# Patient Record
Sex: MALE | Race: BLACK OR AFRICAN AMERICAN | NOT HISPANIC OR LATINO | Employment: FULL TIME | RURAL
[De-identification: names, ages, dates, MRNs, and addresses within clinical notes are randomized per-mention and may not be internally consistent; named-entity substitution may affect disease eponyms.]

---

## 2021-10-01 ENCOUNTER — OFFICE VISIT (OUTPATIENT)
Dept: PRIMARY CARE CLINIC | Facility: CLINIC | Age: 36
End: 2021-10-01
Payer: COMMERCIAL

## 2021-10-01 VITALS
DIASTOLIC BLOOD PRESSURE: 68 MMHG | HEIGHT: 72 IN | HEART RATE: 63 BPM | WEIGHT: 206 LBS | SYSTOLIC BLOOD PRESSURE: 110 MMHG | RESPIRATION RATE: 20 BRPM | OXYGEN SATURATION: 99 % | TEMPERATURE: 98 F | BODY MASS INDEX: 27.9 KG/M2

## 2021-10-01 DIAGNOSIS — Z02.89 ENCOUNTER FOR PHYSICAL EXAMINATION RELATED TO EMPLOYMENT: ICD-10-CM

## 2021-10-01 DIAGNOSIS — Z13.1 SCREENING FOR DIABETES MELLITUS: ICD-10-CM

## 2021-10-01 DIAGNOSIS — Z13.220 SCREENING FOR LIPOID DISORDERS: Primary | ICD-10-CM

## 2021-10-01 LAB
CHOLEST SERPL-MCNC: 176 MG/DL (ref 0–200)
CHOLEST/HDLC SERPL: 2.7 {RATIO}
GLUCOSE SERPL-MCNC: 86 MG/DL (ref 74–106)
HDLC SERPL-MCNC: 66 MG/DL (ref 40–60)
LDLC SERPL CALC-MCNC: 93 MG/DL
LDLC/HDLC SERPL: 1.4 {RATIO}
NONHDLC SERPL-MCNC: 110 MG/DL
TRIGL SERPL-MCNC: 87 MG/DL (ref 35–150)
VLDLC SERPL-MCNC: 17 MG/DL

## 2021-10-01 PROCEDURE — 99395 PR PREVENTIVE VISIT,EST,18-39: ICD-10-PCS | Mod: ,,, | Performed by: FAMILY MEDICINE

## 2021-10-01 PROCEDURE — 82947 ASSAY GLUCOSE BLOOD QUANT: CPT | Mod: ,,, | Performed by: CLINICAL MEDICAL LABORATORY

## 2021-10-01 PROCEDURE — 82947 GLUCOSE, FASTING: ICD-10-PCS | Mod: ,,, | Performed by: CLINICAL MEDICAL LABORATORY

## 2021-10-01 PROCEDURE — 80061 LIPID PANEL: ICD-10-PCS | Mod: ,,, | Performed by: CLINICAL MEDICAL LABORATORY

## 2021-10-01 PROCEDURE — 80061 LIPID PANEL: CPT | Mod: ,,, | Performed by: CLINICAL MEDICAL LABORATORY

## 2021-10-01 PROCEDURE — 99395 PREV VISIT EST AGE 18-39: CPT | Mod: ,,, | Performed by: FAMILY MEDICINE

## 2022-01-03 PROBLEM — Z13.220 SCREENING FOR LIPOID DISORDERS: Status: RESOLVED | Noted: 2021-10-01 | Resolved: 2022-01-03

## 2022-06-17 ENCOUNTER — OFFICE VISIT (OUTPATIENT)
Dept: PRIMARY CARE CLINIC | Facility: CLINIC | Age: 37
End: 2022-06-17
Payer: COMMERCIAL

## 2022-06-17 VITALS
HEIGHT: 72 IN | SYSTOLIC BLOOD PRESSURE: 120 MMHG | WEIGHT: 190 LBS | HEART RATE: 60 BPM | OXYGEN SATURATION: 96 % | DIASTOLIC BLOOD PRESSURE: 78 MMHG | TEMPERATURE: 98 F | BODY MASS INDEX: 25.73 KG/M2

## 2022-06-17 DIAGNOSIS — F41.9 ANXIETY: ICD-10-CM

## 2022-06-17 DIAGNOSIS — R00.2 HEART PALPITATIONS: ICD-10-CM

## 2022-06-17 DIAGNOSIS — R53.83 FATIGUE, UNSPECIFIED TYPE: Primary | ICD-10-CM

## 2022-06-17 DIAGNOSIS — R07.89 ATYPICAL CHEST PAIN: ICD-10-CM

## 2022-06-17 LAB
ALBUMIN SERPL BCP-MCNC: 4 G/DL (ref 3.5–5)
ALBUMIN/GLOB SERPL: 1.1 {RATIO}
ALP SERPL-CCNC: 44 U/L (ref 45–115)
ALT SERPL W P-5'-P-CCNC: 34 U/L (ref 16–61)
ANION GAP SERPL CALCULATED.3IONS-SCNC: 9 MMOL/L (ref 7–16)
ANISOCYTOSIS BLD QL SMEAR: ABNORMAL
AST SERPL W P-5'-P-CCNC: 63 U/L (ref 15–37)
BASOPHILS # BLD AUTO: 0.05 K/UL (ref 0–0.2)
BASOPHILS NFR BLD AUTO: 1 % (ref 0–1)
BILIRUB SERPL-MCNC: 0.9 MG/DL (ref 0–1.2)
BUN SERPL-MCNC: 18 MG/DL (ref 7–18)
BUN/CREAT SERPL: 13 (ref 6–20)
CALCIUM SERPL-MCNC: 9.5 MG/DL (ref 8.5–10.1)
CHLORIDE SERPL-SCNC: 106 MMOL/L (ref 98–107)
CO2 SERPL-SCNC: 31 MMOL/L (ref 21–32)
CREAT SERPL-MCNC: 1.4 MG/DL (ref 0.7–1.3)
DIFFERENTIAL METHOD BLD: ABNORMAL
EOSINOPHIL # BLD AUTO: 0.02 K/UL (ref 0–0.5)
EOSINOPHIL NFR BLD AUTO: 0.4 % (ref 1–4)
ERYTHROCYTE [DISTWIDTH] IN BLOOD BY AUTOMATED COUNT: 15.6 % (ref 11.5–14.5)
GLOBULIN SER-MCNC: 3.5 G/DL (ref 2–4)
GLUCOSE SERPL-MCNC: 77 MG/DL (ref 74–106)
HCT VFR BLD AUTO: 51.2 % (ref 40–54)
HGB BLD-MCNC: 15.5 G/DL (ref 13.5–18)
HYPOCHROMIA BLD QL SMEAR: ABNORMAL
IMM GRANULOCYTES # BLD AUTO: 0.01 K/UL (ref 0–0.04)
IMM GRANULOCYTES NFR BLD: 0.2 % (ref 0–0.4)
LYMPHOCYTES # BLD AUTO: 2.04 K/UL (ref 1–4.8)
LYMPHOCYTES NFR BLD AUTO: 42.5 % (ref 27–41)
MCH RBC QN AUTO: 23.4 PG (ref 27–31)
MCHC RBC AUTO-ENTMCNC: 30.3 G/DL (ref 32–36)
MCV RBC AUTO: 77.2 FL (ref 80–96)
MICROCYTES BLD QL SMEAR: ABNORMAL
MONOCYTES # BLD AUTO: 0.47 K/UL (ref 0–0.8)
MONOCYTES NFR BLD AUTO: 9.8 % (ref 2–6)
MPC BLD CALC-MCNC: 11.5 FL (ref 9.4–12.4)
NEUTROPHILS # BLD AUTO: 2.21 K/UL (ref 1.8–7.7)
NEUTROPHILS NFR BLD AUTO: 46.1 % (ref 53–65)
NRBC # BLD AUTO: 0 X10E3/UL
NRBC, AUTO (.00): 0 %
PLATELET # BLD AUTO: 211 K/UL (ref 150–400)
PLATELET MORPHOLOGY: ABNORMAL
POTASSIUM SERPL-SCNC: 4.6 MMOL/L (ref 3.5–5.1)
PROT SERPL-MCNC: 7.5 G/DL (ref 6.4–8.2)
RBC # BLD AUTO: 6.63 M/UL (ref 4.6–6.2)
SODIUM SERPL-SCNC: 141 MMOL/L (ref 136–145)
TSH SERPL DL<=0.005 MIU/L-ACNC: 0.33 UIU/ML (ref 0.36–3.74)
WBC # BLD AUTO: 4.8 K/UL (ref 4.5–11)

## 2022-06-17 PROCEDURE — 3078F PR MOST RECENT DIASTOLIC BLOOD PRESSURE < 80 MM HG: ICD-10-PCS | Mod: CPTII,,, | Performed by: FAMILY MEDICINE

## 2022-06-17 PROCEDURE — 80053 COMPREHENSIVE METABOLIC PANEL: ICD-10-PCS | Mod: ,,, | Performed by: CLINICAL MEDICAL LABORATORY

## 2022-06-17 PROCEDURE — 99213 PR OFFICE/OUTPT VISIT, EST, LEVL III, 20-29 MIN: ICD-10-PCS | Mod: ,,, | Performed by: FAMILY MEDICINE

## 2022-06-17 PROCEDURE — 85025 CBC WITH DIFFERENTIAL: ICD-10-PCS | Mod: ,,, | Performed by: CLINICAL MEDICAL LABORATORY

## 2022-06-17 PROCEDURE — 3008F PR BODY MASS INDEX (BMI) DOCUMENTED: ICD-10-PCS | Mod: CPTII,,, | Performed by: FAMILY MEDICINE

## 2022-06-17 PROCEDURE — 99213 OFFICE O/P EST LOW 20 MIN: CPT | Mod: ,,, | Performed by: FAMILY MEDICINE

## 2022-06-17 PROCEDURE — 1159F PR MEDICATION LIST DOCUMENTED IN MEDICAL RECORD: ICD-10-PCS | Mod: CPTII,,, | Performed by: FAMILY MEDICINE

## 2022-06-17 PROCEDURE — 93005 PR ELECTROCARDIOGRAM, TRACING: ICD-10-PCS | Mod: ,,, | Performed by: FAMILY MEDICINE

## 2022-06-17 PROCEDURE — 84443 TSH: ICD-10-PCS | Mod: ,,, | Performed by: CLINICAL MEDICAL LABORATORY

## 2022-06-17 PROCEDURE — 3008F BODY MASS INDEX DOCD: CPT | Mod: CPTII,,, | Performed by: FAMILY MEDICINE

## 2022-06-17 PROCEDURE — 80053 COMPREHEN METABOLIC PANEL: CPT | Mod: ,,, | Performed by: CLINICAL MEDICAL LABORATORY

## 2022-06-17 PROCEDURE — 1159F MED LIST DOCD IN RCRD: CPT | Mod: CPTII,,, | Performed by: FAMILY MEDICINE

## 2022-06-17 PROCEDURE — 85025 COMPLETE CBC W/AUTO DIFF WBC: CPT | Mod: ,,, | Performed by: CLINICAL MEDICAL LABORATORY

## 2022-06-17 PROCEDURE — 3078F DIAST BP <80 MM HG: CPT | Mod: CPTII,,, | Performed by: FAMILY MEDICINE

## 2022-06-17 PROCEDURE — 3074F PR MOST RECENT SYSTOLIC BLOOD PRESSURE < 130 MM HG: ICD-10-PCS | Mod: CPTII,,, | Performed by: FAMILY MEDICINE

## 2022-06-17 PROCEDURE — 84443 ASSAY THYROID STIM HORMONE: CPT | Mod: ,,, | Performed by: CLINICAL MEDICAL LABORATORY

## 2022-06-17 PROCEDURE — 93005 ELECTROCARDIOGRAM TRACING: CPT | Mod: ,,, | Performed by: FAMILY MEDICINE

## 2022-06-17 PROCEDURE — 3074F SYST BP LT 130 MM HG: CPT | Mod: CPTII,,, | Performed by: FAMILY MEDICINE

## 2022-06-22 ENCOUNTER — TELEPHONE (OUTPATIENT)
Dept: PRIMARY CARE CLINIC | Facility: CLINIC | Age: 37
End: 2022-06-22
Payer: COMMERCIAL

## 2022-08-30 ENCOUNTER — OFFICE VISIT (OUTPATIENT)
Dept: PRIMARY CARE CLINIC | Facility: CLINIC | Age: 37
End: 2022-08-30
Payer: COMMERCIAL

## 2022-08-30 VITALS
RESPIRATION RATE: 20 BRPM | BODY MASS INDEX: 24.12 KG/M2 | SYSTOLIC BLOOD PRESSURE: 122 MMHG | TEMPERATURE: 99 F | OXYGEN SATURATION: 98 % | HEART RATE: 69 BPM | DIASTOLIC BLOOD PRESSURE: 82 MMHG | WEIGHT: 182 LBS | HEIGHT: 73 IN

## 2022-08-30 DIAGNOSIS — J32.9 SINUSITIS, UNSPECIFIED CHRONICITY, UNSPECIFIED LOCATION: Primary | ICD-10-CM

## 2022-08-30 DIAGNOSIS — J06.9 UPPER RESPIRATORY TRACT INFECTION, UNSPECIFIED TYPE: ICD-10-CM

## 2022-08-30 PROCEDURE — 3074F PR MOST RECENT SYSTOLIC BLOOD PRESSURE < 130 MM HG: ICD-10-PCS | Mod: CPTII,,, | Performed by: FAMILY MEDICINE

## 2022-08-30 PROCEDURE — 3074F SYST BP LT 130 MM HG: CPT | Mod: CPTII,,, | Performed by: FAMILY MEDICINE

## 2022-08-30 PROCEDURE — 99213 PR OFFICE/OUTPT VISIT, EST, LEVL III, 20-29 MIN: ICD-10-PCS | Mod: 25,,, | Performed by: FAMILY MEDICINE

## 2022-08-30 PROCEDURE — 1160F RVW MEDS BY RX/DR IN RCRD: CPT | Mod: CPTII,,, | Performed by: FAMILY MEDICINE

## 2022-08-30 PROCEDURE — 3079F DIAST BP 80-89 MM HG: CPT | Mod: CPTII,,, | Performed by: FAMILY MEDICINE

## 2022-08-30 PROCEDURE — 1159F MED LIST DOCD IN RCRD: CPT | Mod: CPTII,,, | Performed by: FAMILY MEDICINE

## 2022-08-30 PROCEDURE — 96372 PR INJECTION,THERAP/PROPH/DIAG2ST, IM OR SUBCUT: ICD-10-PCS | Mod: ,,, | Performed by: FAMILY MEDICINE

## 2022-08-30 PROCEDURE — 96372 THER/PROPH/DIAG INJ SC/IM: CPT | Mod: ,,, | Performed by: FAMILY MEDICINE

## 2022-08-30 PROCEDURE — 3008F BODY MASS INDEX DOCD: CPT | Mod: CPTII,,, | Performed by: FAMILY MEDICINE

## 2022-08-30 PROCEDURE — 3008F PR BODY MASS INDEX (BMI) DOCUMENTED: ICD-10-PCS | Mod: CPTII,,, | Performed by: FAMILY MEDICINE

## 2022-08-30 PROCEDURE — 1159F PR MEDICATION LIST DOCUMENTED IN MEDICAL RECORD: ICD-10-PCS | Mod: CPTII,,, | Performed by: FAMILY MEDICINE

## 2022-08-30 PROCEDURE — 99213 OFFICE O/P EST LOW 20 MIN: CPT | Mod: 25,,, | Performed by: FAMILY MEDICINE

## 2022-08-30 PROCEDURE — 3079F PR MOST RECENT DIASTOLIC BLOOD PRESSURE 80-89 MM HG: ICD-10-PCS | Mod: CPTII,,, | Performed by: FAMILY MEDICINE

## 2022-08-30 PROCEDURE — 1160F PR REVIEW ALL MEDS BY PRESCRIBER/CLIN PHARMACIST DOCUMENTED: ICD-10-PCS | Mod: CPTII,,, | Performed by: FAMILY MEDICINE

## 2022-08-30 RX ORDER — METHYLPREDNISOLONE 4 MG/1
TABLET ORAL
Qty: 21 EACH | Refills: 0 | Status: SHIPPED | OUTPATIENT
Start: 2022-08-30 | End: 2022-09-20

## 2022-08-30 RX ORDER — CEFDINIR 300 MG/1
300 CAPSULE ORAL 2 TIMES DAILY
Qty: 20 CAPSULE | Refills: 0 | Status: SHIPPED | OUTPATIENT
Start: 2022-08-30 | End: 2022-09-09

## 2022-08-30 RX ORDER — BETAMETHASONE SODIUM PHOSPHATE AND BETAMETHASONE ACETATE 3; 3 MG/ML; MG/ML
6 INJECTION, SUSPENSION INTRA-ARTICULAR; INTRALESIONAL; INTRAMUSCULAR; SOFT TISSUE
Status: COMPLETED | OUTPATIENT
Start: 2022-08-30 | End: 2022-08-30

## 2022-08-30 RX ORDER — CEFTRIAXONE 1 G/1
1 INJECTION, POWDER, FOR SOLUTION INTRAMUSCULAR; INTRAVENOUS
Status: COMPLETED | OUTPATIENT
Start: 2022-08-30 | End: 2022-08-30

## 2022-08-30 RX ORDER — DEXCHLORPHENIRAMINE MALEATE, DEXTROMETHORPHAN HBR, PHENYLEPHRINE HCL 1; 10; 5 MG/5ML; MG/5ML; MG/5ML
10 SYRUP ORAL
Qty: 240 ML | Refills: 1 | Status: SHIPPED | OUTPATIENT
Start: 2022-08-30

## 2022-08-30 RX ADMIN — BETAMETHASONE SODIUM PHOSPHATE AND BETAMETHASONE ACETATE 6 MG: 3; 3 INJECTION, SUSPENSION INTRA-ARTICULAR; INTRALESIONAL; INTRAMUSCULAR; SOFT TISSUE at 12:08

## 2022-08-30 RX ADMIN — CEFTRIAXONE 1 G: 1 INJECTION, POWDER, FOR SOLUTION INTRAMUSCULAR; INTRAVENOUS at 12:08

## 2022-08-30 NOTE — PROGRESS NOTES
Subjective:       Patient ID: Mane Newton is a 36 y.o. male.    Chief Complaint: Sinus Problem, Nasal Congestion, and Cough    Congested with a cough. No fever.    Sinus Problem  Associated symptoms include congestion and coughing. Pertinent negatives include no headaches or shortness of breath.   Cough  Pertinent negatives include no chest pain, fever, headaches, myalgias or shortness of breath. There is no history of environmental allergies.   Review of Systems   Constitutional:  Negative for fatigue and fever.   HENT:  Positive for nasal congestion. Negative for dental problem.    Eyes:  Negative for discharge.   Respiratory:  Positive for cough. Negative for choking, chest tightness and shortness of breath.    Cardiovascular:  Negative for chest pain and leg swelling.   Gastrointestinal:  Negative for constipation, diarrhea, nausea and vomiting.   Genitourinary:  Negative for discharge and flank pain.   Musculoskeletal:  Negative for arthralgias and myalgias.   Allergic/Immunologic: Negative for environmental allergies.   Neurological:  Negative for headaches and memory loss.   Psychiatric/Behavioral:  Negative for behavioral problems and hallucinations.        Objective:      Physical Exam  Vitals and nursing note reviewed.   Constitutional:       Appearance: Normal appearance. He is normal weight.   HENT:      Head: Normocephalic and atraumatic.      Right Ear: Tympanic membrane normal.      Left Ear: Tympanic membrane normal.      Nose: Congestion present.      Mouth/Throat:      Mouth: Mucous membranes are moist.      Pharynx: No oropharyngeal exudate or posterior oropharyngeal erythema.   Eyes:      Extraocular Movements: Extraocular movements intact.      Conjunctiva/sclera: Conjunctivae normal.      Pupils: Pupils are equal, round, and reactive to light.   Cardiovascular:      Rate and Rhythm: Normal rate and regular rhythm.      Pulses: Normal pulses.   Pulmonary:      Effort: Pulmonary effort is  normal.      Breath sounds: Normal breath sounds.   Abdominal:      General: Abdomen is flat. Bowel sounds are normal.      Palpations: Abdomen is soft.   Musculoskeletal:         General: Normal range of motion.      Cervical back: Normal range of motion and neck supple.   Skin:     General: Skin is warm and dry.   Neurological:      General: No focal deficit present.      Mental Status: He is alert and oriented to person, place, and time.   Psychiatric:         Mood and Affect: Mood normal.       Assessment:       Problem List Items Addressed This Visit          ENT    Sinusitis - Primary    Relevant Medications    cefTRIAXone injection 1 g (Start on 8/30/2022 12:15 PM)    betamethasone acetate-betamethasone sodium phosphate injection 6 mg (Start on 8/30/2022 12:15 PM)    Upper respiratory tract infection    Relevant Medications    cefdinir (OMNICEF) 300 MG capsule    methylPREDNISolone (MEDROL DOSEPACK) 4 mg tablet    dexchlorphen-phenylephrine-DM (POLYTUSSIN DM) 1-5-10 mg/5 mL Syrp       Plan:       RTC if s/sx continue

## 2022-10-20 ENCOUNTER — OFFICE VISIT (OUTPATIENT)
Dept: FAMILY MEDICINE | Facility: CLINIC | Age: 37
End: 2022-10-20
Payer: COMMERCIAL

## 2022-10-20 VITALS
SYSTOLIC BLOOD PRESSURE: 110 MMHG | HEART RATE: 73 BPM | HEIGHT: 73 IN | WEIGHT: 191.63 LBS | TEMPERATURE: 98 F | DIASTOLIC BLOOD PRESSURE: 80 MMHG | OXYGEN SATURATION: 98 % | BODY MASS INDEX: 25.4 KG/M2

## 2022-10-20 DIAGNOSIS — R05.1 ACUTE COUGH: ICD-10-CM

## 2022-10-20 DIAGNOSIS — J01.00 ACUTE NON-RECURRENT MAXILLARY SINUSITIS: Primary | ICD-10-CM

## 2022-10-20 PROCEDURE — 99213 OFFICE O/P EST LOW 20 MIN: CPT | Mod: 25,,, | Performed by: FAMILY MEDICINE

## 2022-10-20 PROCEDURE — 3074F PR MOST RECENT SYSTOLIC BLOOD PRESSURE < 130 MM HG: ICD-10-PCS | Mod: CPTII,,, | Performed by: FAMILY MEDICINE

## 2022-10-20 PROCEDURE — 3079F PR MOST RECENT DIASTOLIC BLOOD PRESSURE 80-89 MM HG: ICD-10-PCS | Mod: CPTII,,, | Performed by: FAMILY MEDICINE

## 2022-10-20 PROCEDURE — 3074F SYST BP LT 130 MM HG: CPT | Mod: CPTII,,, | Performed by: FAMILY MEDICINE

## 2022-10-20 PROCEDURE — 1159F MED LIST DOCD IN RCRD: CPT | Mod: CPTII,,, | Performed by: FAMILY MEDICINE

## 2022-10-20 PROCEDURE — 99213 PR OFFICE/OUTPT VISIT, EST, LEVL III, 20-29 MIN: ICD-10-PCS | Mod: 25,,, | Performed by: FAMILY MEDICINE

## 2022-10-20 PROCEDURE — 96372 PR INJECTION,THERAP/PROPH/DIAG2ST, IM OR SUBCUT: ICD-10-PCS | Mod: ,,, | Performed by: FAMILY MEDICINE

## 2022-10-20 PROCEDURE — 1159F PR MEDICATION LIST DOCUMENTED IN MEDICAL RECORD: ICD-10-PCS | Mod: CPTII,,, | Performed by: FAMILY MEDICINE

## 2022-10-20 PROCEDURE — 96372 THER/PROPH/DIAG INJ SC/IM: CPT | Mod: ,,, | Performed by: FAMILY MEDICINE

## 2022-10-20 PROCEDURE — 3079F DIAST BP 80-89 MM HG: CPT | Mod: CPTII,,, | Performed by: FAMILY MEDICINE

## 2022-10-20 RX ORDER — AZITHROMYCIN 250 MG/1
TABLET, FILM COATED ORAL
Qty: 6 TABLET | Refills: 0 | Status: SHIPPED | OUTPATIENT
Start: 2022-10-20

## 2022-10-20 RX ORDER — METHYLPREDNISOLONE ACETATE 80 MG/ML
40 INJECTION, SUSPENSION INTRA-ARTICULAR; INTRALESIONAL; INTRAMUSCULAR; SOFT TISSUE
Status: COMPLETED | OUTPATIENT
Start: 2022-10-20 | End: 2022-10-20

## 2022-10-20 RX ORDER — BROMPHENIRAMINE MALEATE, PSEUDOEPHEDRINE HYDROCHLORIDE, AND DEXTROMETHORPHAN HYDROBROMIDE 2; 30; 10 MG/5ML; MG/5ML; MG/5ML
10 SYRUP ORAL EVERY 4 HOURS PRN
Qty: 240 ML | Refills: 0 | Status: SHIPPED | OUTPATIENT
Start: 2022-10-20 | End: 2022-10-30

## 2022-10-20 RX ORDER — CEFTRIAXONE 1 G/1
1 INJECTION, POWDER, FOR SOLUTION INTRAMUSCULAR; INTRAVENOUS
Status: COMPLETED | OUTPATIENT
Start: 2022-10-20 | End: 2022-10-20

## 2022-10-20 RX ORDER — DEXAMETHASONE SODIUM PHOSPHATE 4 MG/ML
4 INJECTION, SOLUTION INTRA-ARTICULAR; INTRALESIONAL; INTRAMUSCULAR; INTRAVENOUS; SOFT TISSUE
Status: COMPLETED | OUTPATIENT
Start: 2022-10-20 | End: 2022-10-20

## 2022-10-20 RX ADMIN — DEXAMETHASONE SODIUM PHOSPHATE 4 MG: 4 INJECTION, SOLUTION INTRA-ARTICULAR; INTRALESIONAL; INTRAMUSCULAR; INTRAVENOUS; SOFT TISSUE at 10:10

## 2022-10-20 RX ADMIN — CEFTRIAXONE 1 G: 1 INJECTION, POWDER, FOR SOLUTION INTRAMUSCULAR; INTRAVENOUS at 10:10

## 2022-10-20 RX ADMIN — METHYLPREDNISOLONE ACETATE 40 MG: 80 INJECTION, SUSPENSION INTRA-ARTICULAR; INTRALESIONAL; INTRAMUSCULAR; SOFT TISSUE at 10:10

## 2022-10-20 NOTE — PROGRESS NOTES
Sukh Centeno MD   [facility]  [address] [place] [address]     PATIENT NAME: Mane Newton  : 1985  DATE: 10/20/22  MRN: [id number]      Billing Provider: Sukh Centeno MD  Level of Service:   Patient PCP Information       Provider PCP Type    Diane Lord MD General            Reason for Visit / Chief Complaint: Sinusitis (Cough, runny nose, sore throat, and sneezing x a couple of days. Child tested positive for Flu on 10/18/22. )         History of Present Illness / Problem Focused Workflow     Mane Newton presents to the clinic with Sinusitis (Cough, runny nose, sore throat, and sneezing x a couple of days. Child tested positive for Flu on 10/18/22. )     HPI    Review of Systems     Review of Systems   Constitutional:  Negative for activity change, appetite change, fatigue and fever.   HENT:  Positive for rhinorrhea and sinus pressure/congestion. Negative for nasal congestion, ear pain, hearing loss and sore throat.    Respiratory:  Positive for cough. Negative for chest tightness and shortness of breath.    Cardiovascular:  Negative for chest pain and palpitations.   Gastrointestinal:  Negative for abdominal pain and fecal incontinence.   Genitourinary:  Negative for bladder incontinence, difficulty urinating and erectile dysfunction.   Musculoskeletal:  Negative for arthralgias.   Integumentary:  Negative for rash.   Neurological:  Negative for dizziness and headaches.      Medical / Social / Family History   History reviewed. No pertinent past medical history.    History reviewed. No pertinent surgical history.    Social History  Mane Newton  reports that he has never smoked. He has never used smokeless tobacco. He reports current alcohol use. He reports that he does not use drugs.    Family History  Mane Newton  family history includes Cancer in his father.    Medications and Allergies     Medications  No outpatient medications have  been marked as taking for the 10/20/22 encounter (Office Visit) with Sukh Centeno MD.       Allergies  Review of patient's allergies indicates:  No Known Allergies    Physical Examination     Vitals:    10/20/22 1018   BP: 110/80   Pulse: 73   Temp: 97.9 °F (36.6 °C)     Physical Exam  Constitutional:       General: He is not in acute distress.     Appearance: He is not ill-appearing.   HENT:      Head: Normocephalic and atraumatic.      Right Ear: Tympanic membrane and ear canal normal.      Left Ear: Tympanic membrane and ear canal normal.      Nose: Congestion and rhinorrhea present.      Mouth/Throat:      Pharynx: Oropharyngeal exudate and posterior oropharyngeal erythema present.   Eyes:      Pupils: Pupils are equal, round, and reactive to light.   Cardiovascular:      Rate and Rhythm: Normal rate and regular rhythm.      Pulses: Normal pulses.      Heart sounds: No murmur heard.  Pulmonary:      Effort: No respiratory distress.      Breath sounds: No wheezing, rhonchi or rales.   Abdominal:      General: Bowel sounds are normal.      Palpations: Abdomen is soft.      Tenderness: There is no abdominal tenderness.      Hernia: No hernia is present.   Musculoskeletal:      Cervical back: Normal range of motion and neck supple.   Lymphadenopathy:      Cervical: No cervical adenopathy.   Skin:     General: Skin is warm and dry.   Neurological:      Mental Status: He is alert.   Psychiatric:         Behavior: Behavior normal.         Thought Content: Thought content normal.        Assessment and Plan (including Health Maintenance)   :    Plan:         Health Maintenance Due   Topic Date Due    Hepatitis C Screening  Never done    COVID-19 Vaccine (1) Never done    HIV Screening  Never done    TETANUS VACCINE  Never done    Influenza Vaccine (1) Never done       Problem List Items Addressed This Visit    None    There are no diagnoses linked to this encounter.   Health Maintenance Topics with due  status: Not Due       Topic Last Completion Date    Lipid Panel 10/01/2021       Procedures     No future appointments.     No follow-ups on file.       Signature:  Sukh Centeno MD  [facility]  [address] [place] [address]  [phone] Phone  [phone] Fax    Date of encounter: 10/20/22

## 2022-12-12 ENCOUNTER — OFFICE VISIT (OUTPATIENT)
Dept: PRIMARY CARE CLINIC | Facility: CLINIC | Age: 37
End: 2022-12-12
Payer: COMMERCIAL

## 2022-12-12 VITALS
SYSTOLIC BLOOD PRESSURE: 116 MMHG | BODY MASS INDEX: 25.05 KG/M2 | WEIGHT: 189 LBS | TEMPERATURE: 98 F | DIASTOLIC BLOOD PRESSURE: 62 MMHG | HEART RATE: 71 BPM | RESPIRATION RATE: 18 BRPM | HEIGHT: 73 IN | OXYGEN SATURATION: 98 %

## 2022-12-12 DIAGNOSIS — Z00.00 ENCOUNTER FOR ANNUAL HEALTH EXAMINATION: Primary | ICD-10-CM

## 2022-12-12 LAB
CHOLEST SERPL-MCNC: 185 MG/DL (ref 0–200)
CHOLEST/HDLC SERPL: 2.3 {RATIO}
GLUCOSE SERPL-MCNC: 95 MG/DL (ref 74–106)
HDLC SERPL-MCNC: 82 MG/DL (ref 40–60)
LDLC SERPL CALC-MCNC: 91 MG/DL
LDLC/HDLC SERPL: 1.1 {RATIO}
NONHDLC SERPL-MCNC: 103 MG/DL
TRIGL SERPL-MCNC: 59 MG/DL (ref 35–150)
VLDLC SERPL-MCNC: 12 MG/DL

## 2022-12-12 PROCEDURE — 3078F PR MOST RECENT DIASTOLIC BLOOD PRESSURE < 80 MM HG: ICD-10-PCS | Mod: CPTII,,, | Performed by: NURSE PRACTITIONER

## 2022-12-12 PROCEDURE — 3074F SYST BP LT 130 MM HG: CPT | Mod: CPTII,,, | Performed by: NURSE PRACTITIONER

## 2022-12-12 PROCEDURE — 1159F PR MEDICATION LIST DOCUMENTED IN MEDICAL RECORD: ICD-10-PCS | Mod: CPTII,,, | Performed by: NURSE PRACTITIONER

## 2022-12-12 PROCEDURE — 1159F MED LIST DOCD IN RCRD: CPT | Mod: CPTII,,, | Performed by: NURSE PRACTITIONER

## 2022-12-12 PROCEDURE — 3074F PR MOST RECENT SYSTOLIC BLOOD PRESSURE < 130 MM HG: ICD-10-PCS | Mod: CPTII,,, | Performed by: NURSE PRACTITIONER

## 2022-12-12 PROCEDURE — 82947 GLUCOSE, RANDOM: ICD-10-PCS | Mod: ,,, | Performed by: CLINICAL MEDICAL LABORATORY

## 2022-12-12 PROCEDURE — 99395 PREV VISIT EST AGE 18-39: CPT | Mod: ,,, | Performed by: NURSE PRACTITIONER

## 2022-12-12 PROCEDURE — 82947 ASSAY GLUCOSE BLOOD QUANT: CPT | Mod: ,,, | Performed by: CLINICAL MEDICAL LABORATORY

## 2022-12-12 PROCEDURE — 1160F PR REVIEW ALL MEDS BY PRESCRIBER/CLIN PHARMACIST DOCUMENTED: ICD-10-PCS | Mod: CPTII,,, | Performed by: NURSE PRACTITIONER

## 2022-12-12 PROCEDURE — 80061 LIPID PANEL: CPT | Mod: ,,, | Performed by: CLINICAL MEDICAL LABORATORY

## 2022-12-12 PROCEDURE — 3008F BODY MASS INDEX DOCD: CPT | Mod: CPTII,,, | Performed by: NURSE PRACTITIONER

## 2022-12-12 PROCEDURE — 1160F RVW MEDS BY RX/DR IN RCRD: CPT | Mod: CPTII,,, | Performed by: NURSE PRACTITIONER

## 2022-12-12 PROCEDURE — 99395 PR PREVENTIVE VISIT,EST,18-39: ICD-10-PCS | Mod: ,,, | Performed by: NURSE PRACTITIONER

## 2022-12-12 PROCEDURE — 80061 LIPID PANEL: ICD-10-PCS | Mod: ,,, | Performed by: CLINICAL MEDICAL LABORATORY

## 2022-12-12 PROCEDURE — 3078F DIAST BP <80 MM HG: CPT | Mod: CPTII,,, | Performed by: NURSE PRACTITIONER

## 2022-12-12 PROCEDURE — 3008F PR BODY MASS INDEX (BMI) DOCUMENTED: ICD-10-PCS | Mod: CPTII,,, | Performed by: NURSE PRACTITIONER

## 2022-12-12 NOTE — PROGRESS NOTES
[facility]  Primary Care       PATIENT NAME: Mane Newton   : 1985    AGE: 36 y.o. DATE: 2022    MRN: [id number]        Reason for Visit / Chief Complaint:  Annual Exam (Health wellness screen  physical for GP.)     Subjective:     HPI: Patient here for exam for work/insurance; denies any issues.          Review of Systems: Review of Systems   Constitutional:  Negative for fever.   Respiratory:  Negative for cough and shortness of breath.    Cardiovascular:  Negative for chest pain.   Genitourinary:  Negative for dysuria.   Musculoskeletal:  Negative for gait problem.   Skin:  Negative for rash.   Neurological:  Negative for headaches.        Review of patient's allergies indicates:  No Known Allergies     Med List:  Current Outpatient Medications on File Prior to Visit   Medication Sig Dispense Refill    azithromycin (Z-OZIEL) 250 MG tablet Take 2 tablets by mouth on day 1; Take 1 tablet by mouth on days 2-5 6 tablet 0    dexchlorphen-phenylephrine-DM (POLYTUSSIN DM) 1-5-10 mg/5 mL Syrp Take 10 mLs by mouth every 6 (six) hours while awake. (Patient not taking: Reported on 10/20/2022) 240 mL 1     No current facility-administered medications on file prior to visit.       Medical/Social/Family History:  History reviewed. No pertinent past medical history.   Social History     Tobacco Use   Smoking Status Never   Smokeless Tobacco Never      Social History     Substance and Sexual Activity   Alcohol Use Yes       Family History   Problem Relation Age of Onset    Cancer Father       History reviewed. No pertinent surgical history.     There is no immunization history on file for this patient.       Objective:      Vitals:    22 0844 22 0848   BP: (!) 118/45 116/62   BP Location: Left arm Left arm   Patient Position: Sitting Sitting   BP Method: Large (Automatic)    Pulse: 71    Resp: 18    Temp: 97.8 °F (36.6 °C)    TempSrc: Oral    SpO2: 98%    Weight: 85.7 kg (189 lb)   "  Height: 6' 1" (1.854 m)      Body mass index is 24.94 kg/m².     Physical Exam: Physical Exam  Constitutional:       Appearance: Normal appearance.   HENT:      Head: Normocephalic.      Right Ear: Tympanic membrane, ear canal and external ear normal.      Left Ear: Tympanic membrane, ear canal and external ear normal.      Mouth/Throat:      Mouth: Mucous membranes are moist.   Eyes:      Extraocular Movements: Extraocular movements intact.      Conjunctiva/sclera: Conjunctivae normal.      Pupils: Pupils are equal, round, and reactive to light.   Cardiovascular:      Rate and Rhythm: Normal rate and regular rhythm.      Heart sounds: Normal heart sounds.   Pulmonary:      Effort: Pulmonary effort is normal. No respiratory distress.      Breath sounds: Normal breath sounds. No wheezing.   Musculoskeletal:         General: Normal range of motion.      Cervical back: Normal range of motion and neck supple.   Skin:     General: Skin is warm and dry.   Neurological:      Mental Status: He is alert and oriented to person, place, and time.      Gait: Gait normal.   Psychiatric:         Mood and Affect: Mood normal.         Behavior: Behavior normal.              Assessment:          ICD-10-CM ICD-9-CM   1. Encounter for annual health examination  Z00.00 V70.0        Plan:       Encounter for annual health examination  -     Lipid Panel; Future; Expected date: 12/12/2022  -     Glucose, Random; Future; Expected date: 12/12/2022          New & refilled meds:  Requested Prescriptions      No prescriptions requested or ordered in this encounter       Follow up if symptoms worsen or fail to improve.     There are no Patient Instructions on file for this visit.       Signature: Kyle Oseguera DNP, FNP-C    "

## 2022-12-19 ENCOUNTER — TELEPHONE (OUTPATIENT)
Dept: PRIMARY CARE CLINIC | Facility: CLINIC | Age: 37
End: 2022-12-19
Payer: COMMERCIAL

## 2022-12-19 NOTE — TELEPHONE ENCOUNTER
----- Message from Kyle Oseguera DNP, ZENAIDA-C sent at 12/14/2022 11:36 AM CST -----  Please notify patient of results; complete insurance form.

## 2022-12-21 ENCOUNTER — TELEPHONE (OUTPATIENT)
Dept: PRIMARY CARE CLINIC | Facility: CLINIC | Age: 37
End: 2022-12-21
Payer: COMMERCIAL

## 2023-01-23 ENCOUNTER — TELEPHONE (OUTPATIENT)
Dept: PRIMARY CARE CLINIC | Facility: CLINIC | Age: 38
End: 2023-01-23
Payer: COMMERCIAL

## 2023-03-20 ENCOUNTER — TELEPHONE (OUTPATIENT)
Dept: PRIMARY CARE CLINIC | Facility: CLINIC | Age: 38
End: 2023-03-20
Payer: COMMERCIAL

## 2023-03-20 NOTE — TELEPHONE ENCOUNTER
----- Message from Kyle Oseguera DNP, FNP-C sent at 3/20/2023 10:49 AM CDT -----  Please notify patient of results. He can try and manage with diet and exercise, avoiding sugar, limiting caffeine, limiting dairy products, processed foods.     He can also come in and get Testosterone injection once a month; if he decides to do the injections, he will need to have level repeated in 4 weeks after getting the initial injection.

## 2023-05-16 NOTE — PROGRESS NOTES
Subjective:       Patient ID: Mane Newton is a 36 y.o. male.    Chief Complaint: Chest Pain (Chest discomfort the last two weeks.), Fatigue, and Anxiety      Pt. Has been under stress with issues at home. Having some palpitations. Now with some substernal pain - mild. History of heart disease in family.    Review of Systems   Constitutional: Negative for fatigue and fever.   HENT: Negative for dental problem.    Eyes: Negative for discharge.   Respiratory: Negative for cough, choking, chest tightness and shortness of breath.    Cardiovascular: Positive for chest pain. Negative for leg swelling.   Gastrointestinal: Negative for constipation, diarrhea, nausea and vomiting.   Genitourinary: Negative for discharge and flank pain.   Musculoskeletal: Negative for arthralgias and myalgias.   Allergic/Immunologic: Negative for environmental allergies.   Neurological: Negative for headaches and memory loss.   Psychiatric/Behavioral: Negative for behavioral problems and hallucinations.         Objective:      Physical Exam  Vitals and nursing note reviewed.   Constitutional:       Appearance: Normal appearance. He is normal weight.   HENT:      Head: Normocephalic and atraumatic.      Right Ear: Tympanic membrane normal.      Left Ear: Tympanic membrane normal.      Nose: Nose normal.      Mouth/Throat:      Mouth: Mucous membranes are moist.   Eyes:      Extraocular Movements: Extraocular movements intact.      Conjunctiva/sclera: Conjunctivae normal.      Pupils: Pupils are equal, round, and reactive to light.   Cardiovascular:      Rate and Rhythm: Normal rate and regular rhythm.      Pulses: Normal pulses.   Pulmonary:      Effort: Pulmonary effort is normal.      Breath sounds: Normal breath sounds.   Abdominal:      General: Abdomen is flat. Bowel sounds are normal.      Palpations: Abdomen is soft.   Musculoskeletal:         General: Normal range of motion.      Cervical back: Normal range of motion and neck supple.    Skin:     General: Skin is warm and dry.   Neurological:      General: No focal deficit present.      Mental Status: He is alert and oriented to person, place, and time.   Psychiatric:         Mood and Affect: Mood normal.         Assessment:       Fatigue, unspecified type  -     TSH; Future; Expected date: 06/17/2022  -     CBC Auto Differential; Future; Expected date: 06/17/2022  -     Comprehensive Metabolic Panel; Future; Expected date: 06/17/2022    Heart palpitations  -     TSH; Future; Expected date: 06/17/2022  -     CBC Auto Differential; Future; Expected date: 06/17/2022  -     Comprehensive Metabolic Panel; Future; Expected date: 06/17/2022    Anxiety  -     TSH; Future; Expected date: 06/17/2022  -     CBC Auto Differential; Future; Expected date: 06/17/2022  -     Comprehensive Metabolic Panel; Future; Expected date: 06/17/2022    Atypical chest pain              none

## 2023-06-20 ENCOUNTER — TELEPHONE (OUTPATIENT)
Dept: PRIMARY CARE CLINIC | Facility: CLINIC | Age: 38
End: 2023-06-20
Payer: COMMERCIAL

## 2023-06-20 DIAGNOSIS — Z12.5 PROSTATE CANCER SCREENING: Primary | ICD-10-CM

## 2023-06-20 NOTE — TELEPHONE ENCOUNTER
----- Message from Kyle Oseguera DNP, ZENAIDA-C sent at 6/20/2023 10:36 AM CDT -----  Regarding: regarding referral  Please notify patient that a urology referral with Dr. Montague at [facility] has been made per his request.    Also, was patient notified of testosterone results and recommendations?

## 2023-07-26 ENCOUNTER — HOSPITAL ENCOUNTER (EMERGENCY)
Facility: HOSPITAL | Age: 38
Discharge: HOME | End: 2023-07-26
Payer: COMMERCIAL

## 2023-07-26 VITALS
HEIGHT: 73 IN | HEART RATE: 67 BPM | WEIGHT: 200 LBS | BODY MASS INDEX: 26.51 KG/M2 | RESPIRATION RATE: 19 BRPM | SYSTOLIC BLOOD PRESSURE: 125 MMHG | TEMPERATURE: 98 F | OXYGEN SATURATION: 100 % | DIASTOLIC BLOOD PRESSURE: 64 MMHG

## 2023-07-26 DIAGNOSIS — S76.212A STRAIN OF ADDUCTOR MUSCLE, FASCIA AND TENDON OF LEFT THIGH, INITIAL ENCOUNTER: ICD-10-CM

## 2023-07-26 DIAGNOSIS — S83.512A SPRAIN OF ANTERIOR CRUCIATE LIGAMENT OF LEFT KNEE, INITIAL ENCOUNTER: Primary | ICD-10-CM

## 2023-07-26 PROCEDURE — 29505 APPLICATION LONG LEG SPLINT: CPT | Mod: LT

## 2023-07-26 PROCEDURE — 25000003 PHARM REV CODE 250: Performed by: FAMILY MEDICINE

## 2023-07-26 PROCEDURE — 99283 EMERGENCY DEPT VISIT LOW MDM: CPT | Mod: ,,, | Performed by: FAMILY MEDICINE

## 2023-07-26 PROCEDURE — 63600175 PHARM REV CODE 636 W HCPCS: Performed by: FAMILY MEDICINE

## 2023-07-26 PROCEDURE — 99283 EMERGENCY DEPT VISIT LOW MDM: CPT

## 2023-07-26 PROCEDURE — 99283 PR EMERGENCY DEPT VISIT,LEVEL III: ICD-10-PCS | Mod: ,,, | Performed by: FAMILY MEDICINE

## 2023-07-26 RX ORDER — HYDROCODONE BITARTRATE AND ACETAMINOPHEN 10; 325 MG/1; MG/1
0.5 TABLET ORAL EVERY 6 HOURS PRN
Qty: 10 TABLET | Refills: 0 | Status: SHIPPED | OUTPATIENT
Start: 2023-07-26 | End: 2023-07-31

## 2023-07-26 RX ORDER — HYDROCODONE BITARTRATE AND ACETAMINOPHEN 5; 325 MG/1; MG/1
1 TABLET ORAL
Status: COMPLETED | OUTPATIENT
Start: 2023-07-26 | End: 2023-07-26

## 2023-07-26 RX ORDER — PREDNISONE 20 MG/1
40 TABLET ORAL
Status: COMPLETED | OUTPATIENT
Start: 2023-07-26 | End: 2023-07-26

## 2023-07-26 RX ADMIN — PREDNISONE 40 MG: 20 TABLET ORAL at 10:07

## 2023-07-26 RX ADMIN — HYDROCODONE BITARTRATE AND ACETAMINOPHEN 1 TABLET: 5; 325 TABLET ORAL at 10:07

## 2023-07-27 ENCOUNTER — OFFICE VISIT (OUTPATIENT)
Dept: PRIMARY CARE CLINIC | Facility: CLINIC | Age: 38
End: 2023-07-27
Payer: COMMERCIAL

## 2023-07-27 VITALS
HEIGHT: 73 IN | OXYGEN SATURATION: 99 % | BODY MASS INDEX: 26.51 KG/M2 | HEART RATE: 61 BPM | SYSTOLIC BLOOD PRESSURE: 130 MMHG | TEMPERATURE: 98 F | WEIGHT: 200 LBS | DIASTOLIC BLOOD PRESSURE: 78 MMHG

## 2023-07-27 DIAGNOSIS — S76.212A STRAIN OF ADDUCTOR MUSCLE, FASCIA AND TENDON OF LEFT THIGH, INITIAL ENCOUNTER: ICD-10-CM

## 2023-07-27 DIAGNOSIS — S83.512A SPRAIN OF ANTERIOR CRUCIATE LIGAMENT OF LEFT KNEE, INITIAL ENCOUNTER: ICD-10-CM

## 2023-07-27 DIAGNOSIS — M25.562 ACUTE PAIN OF LEFT KNEE: Primary | ICD-10-CM

## 2023-07-27 PROCEDURE — 99213 PR OFFICE/OUTPT VISIT, EST, LEVL III, 20-29 MIN: ICD-10-PCS | Mod: 25,,, | Performed by: NURSE PRACTITIONER

## 2023-07-27 PROCEDURE — 1159F MED LIST DOCD IN RCRD: CPT | Mod: CPTII,,, | Performed by: NURSE PRACTITIONER

## 2023-07-27 PROCEDURE — 96372 PR INJECTION,THERAP/PROPH/DIAG2ST, IM OR SUBCUT: ICD-10-PCS | Mod: ,,, | Performed by: NURSE PRACTITIONER

## 2023-07-27 PROCEDURE — 99213 OFFICE O/P EST LOW 20 MIN: CPT | Mod: 25,,, | Performed by: NURSE PRACTITIONER

## 2023-07-27 PROCEDURE — 96372 THER/PROPH/DIAG INJ SC/IM: CPT | Mod: ,,, | Performed by: NURSE PRACTITIONER

## 2023-07-27 PROCEDURE — 3075F SYST BP GE 130 - 139MM HG: CPT | Mod: CPTII,,, | Performed by: NURSE PRACTITIONER

## 2023-07-27 PROCEDURE — 3075F PR MOST RECENT SYSTOLIC BLOOD PRESS GE 130-139MM HG: ICD-10-PCS | Mod: CPTII,,, | Performed by: NURSE PRACTITIONER

## 2023-07-27 PROCEDURE — 1160F PR REVIEW ALL MEDS BY PRESCRIBER/CLIN PHARMACIST DOCUMENTED: ICD-10-PCS | Mod: CPTII,,, | Performed by: NURSE PRACTITIONER

## 2023-07-27 PROCEDURE — 1160F RVW MEDS BY RX/DR IN RCRD: CPT | Mod: CPTII,,, | Performed by: NURSE PRACTITIONER

## 2023-07-27 PROCEDURE — 3008F PR BODY MASS INDEX (BMI) DOCUMENTED: ICD-10-PCS | Mod: CPTII,,, | Performed by: NURSE PRACTITIONER

## 2023-07-27 PROCEDURE — 3078F PR MOST RECENT DIASTOLIC BLOOD PRESSURE < 80 MM HG: ICD-10-PCS | Mod: CPTII,,, | Performed by: NURSE PRACTITIONER

## 2023-07-27 PROCEDURE — 1159F PR MEDICATION LIST DOCUMENTED IN MEDICAL RECORD: ICD-10-PCS | Mod: CPTII,,, | Performed by: NURSE PRACTITIONER

## 2023-07-27 PROCEDURE — 3008F BODY MASS INDEX DOCD: CPT | Mod: CPTII,,, | Performed by: NURSE PRACTITIONER

## 2023-07-27 PROCEDURE — 3078F DIAST BP <80 MM HG: CPT | Mod: CPTII,,, | Performed by: NURSE PRACTITIONER

## 2023-07-27 RX ORDER — KETOROLAC TROMETHAMINE 30 MG/ML
30 INJECTION, SOLUTION INTRAMUSCULAR; INTRAVENOUS
Status: COMPLETED | OUTPATIENT
Start: 2023-07-27 | End: 2023-07-27

## 2023-07-27 RX ORDER — IBUPROFEN 800 MG/1
800 TABLET ORAL EVERY 8 HOURS PRN
Qty: 30 TABLET | Refills: 0 | Status: SHIPPED | OUTPATIENT
Start: 2023-07-27

## 2023-07-27 RX ADMIN — KETOROLAC TROMETHAMINE 30 MG: 30 INJECTION, SOLUTION INTRAMUSCULAR; INTRAVENOUS at 02:07

## 2023-07-27 NOTE — LETTER
July 27, 2023      [facility]  [address]   [place] [address]  Phone: [phone]  Fax: [phone]       Patient: Mane Newton   YOB: 1985  Date of Visit: 07/27/2023    To Whom It May Concern:    Tony Newton  was at [facility] on 07/27/2023. The patient may return to work/school on 08- with no restrictions. If you have any questions or concerns, or if I can be of further assistance, please do not hesitate to contact me.    Sincerely,    Sejal Gardner LPN

## 2023-07-27 NOTE — PROGRESS NOTES
Patient is to go to [facility] to see RT VICTOR MANUEL Bob, 7- between 10-11:30/Excuse extended to return Wednesday per VHorn

## 2023-07-27 NOTE — PROGRESS NOTES
[facility]  Primary Care       PATIENT NAME: Mane Newton   : 1985    AGE: 37 y.o. DATE: 2023    MRN: [id number]        Reason for Visit / Chief Complaint:  Knee Injury (Follow up visit from [facility] ED due to left thigh/knee injury.)     Subjective:     HPI: Patient here for follow up ER visit; went to the ER on last evening due to pain to left knee. Patient states he heard a pop in left knee/thigh while playing basketball.     Knee Injury  Associated symptoms include arthralgias (left knee pain). Pertinent negatives include no chest pain, coughing, fever, headaches or rash.        Review of Systems: Review of Systems   Constitutional:  Negative for fever.   Respiratory:  Negative for cough and shortness of breath.    Cardiovascular:  Negative for chest pain.   Genitourinary:  Negative for dysuria.   Musculoskeletal:  Positive for arthralgias (left knee pain) and gait problem.   Skin:  Negative for rash.   Neurological:  Negative for headaches.        Review of patient's allergies indicates:  No Known Allergies     Med List:  Current Outpatient Medications on File Prior to Visit   Medication Sig Dispense Refill    azithromycin (Z-OZIEL) 250 MG tablet Take 2 tablets by mouth on day 1; Take 1 tablet by mouth on days 2-5 6 tablet 0    dexchlorphen-phenylephrine-DM (POLYTUSSIN DM) 1-5-10 mg/5 mL Syrp Take 10 mLs by mouth every 6 (six) hours while awake. 240 mL 1    HYDROcodone-acetaminophen (NORCO)  mg per tablet Take 0.5 tablets by mouth every 6 (six) hours as needed for Pain. 10 tablet 0     Current Facility-Administered Medications on File Prior to Visit   Medication Dose Route Frequency Provider Last Rate Last Admin    [COMPLETED] HYDROcodone-acetaminophen 5-325 mg per tablet 1 tablet  1 tablet Oral ED 1 Time Andre Wade MD   1 tablet at 23    [COMPLETED] predniSONE tablet 40 mg  40 mg Oral ED 1 Time Andre Wade MD   40 mg at 23  "      Medical/Social/Family History:  History reviewed. No pertinent past medical history.   Social History     Tobacco Use   Smoking Status Never   Smokeless Tobacco Never      Social History     Substance and Sexual Activity   Alcohol Use Yes       Family History   Problem Relation Age of Onset    Cancer Father       History reviewed. No pertinent surgical history.     There is no immunization history on file for this patient.       Objective:      Vitals:    07/27/23 1338   BP: 130/78   BP Location: Right arm   Patient Position: Sitting   BP Method: Large (Manual)   Pulse: 61   Temp: 97.9 °F (36.6 °C)   TempSrc: Oral   SpO2: 99%   Weight: 90.7 kg (200 lb)   Height: 6' 1" (1.854 m)     Body mass index is 26.39 kg/m².     Physical Exam: Physical Exam  Constitutional:       Appearance: Normal appearance.   HENT:      Head: Normocephalic.      Mouth/Throat:      Mouth: Mucous membranes are moist.   Eyes:      Pupils: Pupils are equal, round, and reactive to light.   Cardiovascular:      Rate and Rhythm: Normal rate and regular rhythm.      Heart sounds: Normal heart sounds.   Pulmonary:      Effort: Pulmonary effort is normal.      Breath sounds: Normal breath sounds.   Musculoskeletal:         General: Swelling (patient has swelling to the area right above left knee) present. No tenderness. Normal range of motion.      Cervical back: Normal range of motion.        Legs:    Skin:     General: Skin is warm and dry.   Neurological:      Mental Status: He is alert and oriented to person, place, and time.      Gait: Gait normal.   Psychiatric:         Mood and Affect: Mood normal.              Assessment:          ICD-10-CM ICD-9-CM   1. Acute pain of left knee  M25.562 719.46        Plan:       Acute pain of left knee  -     X-Ray Knee 1 or 2 View Left; Future; Expected date: 07/27/2023  -     ketorolac injection 30 mg      Continue ice pack to affected area prn; elevate left lower extremity; continue splint to left " lower extremity    New & refilled meds:  Requested Prescriptions      No prescriptions requested or ordered in this encounter     Follow up as needed if symptoms persist or worsen.     There are no Patient Instructions on file for this visit.         Signature: Kyle Oseguera DNP, ZENAIDA-C

## 2023-07-27 NOTE — LETTER
July 27, 2023      [facility]  [address]   [place] [address]  Phone: [phone]  Fax: [phone]       Patient: Mane Newton   YOB: 1985  Date of Visit: 07/27/2023    To Whom It May Concern:    Tony Newton  was at [facility] on 07/27/2023. The patient may return to work/school on 07- with no restrictions. If you have any questions or concerns, or if I can be of further assistance, please do not hesitate to contact me.    Sincerely,    Sejal Gardner LPN

## 2023-07-27 NOTE — ED TRIAGE NOTES
"Pt presents to ed via pov, c/o left knee pain/ states he was playing basketball and went to jump and heard a "POP"  "

## 2023-07-27 NOTE — ED PROVIDER NOTES
Encounter Date: 7/26/2023       History     Chief Complaint   Patient presents with    Knee Injury     37 year old male presents to ER via POV for pain to left thigh/knee following an injury at basketball ealier today. Limps.    The history is provided by the patient. No  was used.   Review of patient's allergies indicates:  No Known Allergies  History reviewed. No pertinent past medical history.  History reviewed. No pertinent surgical history.  Family History   Problem Relation Age of Onset    Cancer Father      Social History     Tobacco Use    Smoking status: Never    Smokeless tobacco: Never   Substance Use Topics    Alcohol use: Yes    Drug use: Never     Review of Systems   Constitutional:  Positive for activity change.   HENT: Negative.     Eyes: Negative.    Respiratory: Negative.     Cardiovascular: Negative.    Gastrointestinal: Negative.    Endocrine: Negative.    Genitourinary: Negative.    Musculoskeletal:  Positive for arthralgias, gait problem and myalgias.   Skin: Negative.    Allergic/Immunologic: Negative.    Hematological: Negative.    Psychiatric/Behavioral: Negative.       Physical Exam     Initial Vitals [07/26/23 2211]   BP Pulse Resp Temp SpO2   125/64 67 18 98.4 °F (36.9 °C) 100 %      MAP       --         Physical Exam    Nursing note and vitals reviewed.  Constitutional: He appears well-developed and well-nourished.   HENT:   Head: Normocephalic and atraumatic.   Right Ear: External ear normal.   Left Ear: External ear normal.   Nose: Nose normal.   Mouth/Throat: Oropharynx is clear and moist.   Eyes: Conjunctivae and EOM are normal. Pupils are equal, round, and reactive to light.   Neck: Neck supple.   Normal range of motion.  Cardiovascular:  Normal rate, regular rhythm, normal heart sounds and intact distal pulses.           Pulmonary/Chest: Breath sounds normal.   Abdominal: Abdomen is soft. Bowel sounds are normal.   Musculoskeletal:      Cervical back: Normal  range of motion and neck supple.     Neurological: He is alert and oriented to person, place, and time. He has normal strength and normal reflexes. GCS score is 15. GCS eye subscore is 4. GCS verbal subscore is 5. GCS motor subscore is 6.   Skin: Skin is warm and dry. Capillary refill takes less than 2 seconds.   Psychiatric: He has a normal mood and affect. His behavior is normal. Judgment and thought content normal.       Medical Screening Exam   See Full Note    ED Course   Procedures  Labs Reviewed - No data to display       Imaging Results    None          Medications   predniSONE tablet 40 mg (has no administration in time range)   HYDROcodone-acetaminophen 5-325 mg per tablet 1 tablet (has no administration in time range)     Medical Decision Making:   Initial Assessment:   Pain in left knee and left ant thigh  Differential Diagnosis:   Sprain of left knee, strain of lt thigh muscle  ED Management:  Pain control. Knee Immobilizer  F/u Ortho                       Clinical Impression:   Final diagnoses:  [S83.512A] Sprain of anterior cruciate ligament of left knee, initial encounter (Primary)  [S76.212A] Strain of adductor muscle, fascia and tendon of left thigh, initial encounter        ED Disposition Condition    Discharge Stable          ED Prescriptions       Medication Sig Dispense Start Date End Date Auth. Provider    HYDROcodone-acetaminophen (NORCO)  mg per tablet Take 0.5 tablets by mouth every 6 (six) hours as needed for Pain. 10 tablet 7/26/2023 7/31/2023 Andre Wade MD          Follow-up Information       Follow up With Specialties Details Why Contact Info    Diane Lord MD Family Medicine In 3 days re check [address] [facility] [place] [id number] [phone]               Andre Wade MD  07/26/23 8471

## 2023-07-28 ENCOUNTER — TELEPHONE (OUTPATIENT)
Dept: PRIMARY CARE CLINIC | Facility: CLINIC | Age: 38
End: 2023-07-28
Payer: COMMERCIAL

## 2023-07-28 NOTE — TELEPHONE ENCOUNTER
----- Message from Kyle Oseguera DNP, ZENAIDA-C sent at 7/27/2023  4:59 PM CDT -----  Please notify of result

## 2023-10-18 ENCOUNTER — OFFICE VISIT (OUTPATIENT)
Dept: PRIMARY CARE CLINIC | Facility: CLINIC | Age: 38
End: 2023-10-18
Payer: COMMERCIAL

## 2023-10-18 VITALS
BODY MASS INDEX: 27.7 KG/M2 | RESPIRATION RATE: 18 BRPM | DIASTOLIC BLOOD PRESSURE: 80 MMHG | OXYGEN SATURATION: 98 % | WEIGHT: 209 LBS | HEART RATE: 62 BPM | HEIGHT: 73 IN | SYSTOLIC BLOOD PRESSURE: 132 MMHG | TEMPERATURE: 98 F

## 2023-10-18 DIAGNOSIS — J02.9 SORE THROAT: Primary | ICD-10-CM

## 2023-10-18 LAB
CTP QC/QA: YES
S PYO RRNA THROAT QL PROBE: NEGATIVE

## 2023-10-18 PROCEDURE — 1160F RVW MEDS BY RX/DR IN RCRD: CPT | Mod: CPTII,,, | Performed by: NURSE PRACTITIONER

## 2023-10-18 PROCEDURE — 87880 POCT RAPID STREP A: ICD-10-PCS | Mod: QW,,, | Performed by: NURSE PRACTITIONER

## 2023-10-18 PROCEDURE — 99212 PR OFFICE/OUTPT VISIT, EST, LEVL II, 10-19 MIN: ICD-10-PCS | Mod: ,,, | Performed by: NURSE PRACTITIONER

## 2023-10-18 PROCEDURE — 1159F MED LIST DOCD IN RCRD: CPT | Mod: CPTII,,, | Performed by: NURSE PRACTITIONER

## 2023-10-18 PROCEDURE — 1159F PR MEDICATION LIST DOCUMENTED IN MEDICAL RECORD: ICD-10-PCS | Mod: CPTII,,, | Performed by: NURSE PRACTITIONER

## 2023-10-18 PROCEDURE — 1160F PR REVIEW ALL MEDS BY PRESCRIBER/CLIN PHARMACIST DOCUMENTED: ICD-10-PCS | Mod: CPTII,,, | Performed by: NURSE PRACTITIONER

## 2023-10-18 PROCEDURE — 3008F PR BODY MASS INDEX (BMI) DOCUMENTED: ICD-10-PCS | Mod: CPTII,,, | Performed by: NURSE PRACTITIONER

## 2023-10-18 PROCEDURE — 3008F BODY MASS INDEX DOCD: CPT | Mod: CPTII,,, | Performed by: NURSE PRACTITIONER

## 2023-10-18 PROCEDURE — 3075F PR MOST RECENT SYSTOLIC BLOOD PRESS GE 130-139MM HG: ICD-10-PCS | Mod: CPTII,,, | Performed by: NURSE PRACTITIONER

## 2023-10-18 PROCEDURE — 3079F DIAST BP 80-89 MM HG: CPT | Mod: CPTII,,, | Performed by: NURSE PRACTITIONER

## 2023-10-18 PROCEDURE — 3075F SYST BP GE 130 - 139MM HG: CPT | Mod: CPTII,,, | Performed by: NURSE PRACTITIONER

## 2023-10-18 PROCEDURE — 99212 OFFICE O/P EST SF 10 MIN: CPT | Mod: ,,, | Performed by: NURSE PRACTITIONER

## 2023-10-18 PROCEDURE — 87880 STREP A ASSAY W/OPTIC: CPT | Mod: QW,,, | Performed by: NURSE PRACTITIONER

## 2023-10-18 PROCEDURE — 3079F PR MOST RECENT DIASTOLIC BLOOD PRESSURE 80-89 MM HG: ICD-10-PCS | Mod: CPTII,,, | Performed by: NURSE PRACTITIONER

## 2023-10-18 NOTE — PROGRESS NOTES
[facility]  Primary Care       PATIENT NAME: Mane Newton   : 1985    AGE: 37 y.o. DATE: 10/18/2023    MRN: [id number]        Reason for Visit / Chief Complaint:  Sore Throat     Subjective:     HPI: Patient complains of sore throat that started yesterday. Denies any fever, cough, runny nose.     Sore Throat   Pertinent negatives include no congestion, coughing, headaches or shortness of breath.          Review of Systems: Review of Systems   Constitutional:  Negative for fever.   HENT:  Positive for sore throat. Negative for congestion and rhinorrhea.    Respiratory:  Negative for cough and shortness of breath.    Cardiovascular:  Negative for chest pain.   Genitourinary:  Negative for dysuria.   Musculoskeletal:  Negative for gait problem.   Skin:  Negative for rash.   Neurological:  Negative for headaches.          Review of patient's allergies indicates:  No Known Allergies     Med List:  Current Outpatient Medications on File Prior to Visit   Medication Sig Dispense Refill    azithromycin (Z-OZIEL) 250 MG tablet Take 2 tablets by mouth on day 1; Take 1 tablet by mouth on days 2-5 (Patient not taking: Reported on 10/18/2023) 6 tablet 0    dexchlorphen-phenylephrine-DM (POLYTUSSIN DM) 1-5-10 mg/5 mL Syrp Take 10 mLs by mouth every 6 (six) hours while awake. (Patient not taking: Reported on 10/18/2023) 240 mL 1    ibuprofen (ADVIL,MOTRIN) 800 MG tablet Take 1 tablet (800 mg total) by mouth every 8 (eight) hours as needed for Pain. (Patient not taking: Reported on 10/18/2023) 30 tablet 0     No current facility-administered medications on file prior to visit.       Medical/Social/Family History:  History reviewed. No pertinent past medical history.   Social History     Tobacco Use   Smoking Status Never   Smokeless Tobacco Never      Social History     Substance and Sexual Activity   Alcohol Use Yes       Family History   Problem Relation Age of Onset    Cancer Father       History  "reviewed. No pertinent surgical history.     There is no immunization history on file for this patient.       Objective:      Vitals:    10/18/23 1037   BP: 132/80   BP Location: Right arm   Patient Position: Sitting   BP Method: Large (Manual)   Pulse: 62   Resp: 18   Temp: 98.1 °F (36.7 °C)   TempSrc: Oral   SpO2: 98%   Weight: 94.8 kg (209 lb)   Height: 6' 1" (1.854 m)     Body mass index is 27.57 kg/m².     Physical Exam: Physical Exam  Constitutional:       General: He is not in acute distress.     Appearance: Normal appearance. He is not ill-appearing, toxic-appearing or diaphoretic.   HENT:      Head: Normocephalic.      Right Ear: Tympanic membrane, ear canal and external ear normal.      Left Ear: Tympanic membrane, ear canal and external ear normal.      Mouth/Throat:      Mouth: Mucous membranes are moist.      Pharynx: No posterior oropharyngeal erythema.   Eyes:      Extraocular Movements: Extraocular movements intact.      Conjunctiva/sclera: Conjunctivae normal.      Pupils: Pupils are equal, round, and reactive to light.   Cardiovascular:      Rate and Rhythm: Normal rate and regular rhythm.      Heart sounds: Normal heart sounds.   Pulmonary:      Effort: Pulmonary effort is normal. No respiratory distress.      Breath sounds: Normal breath sounds. No stridor. No wheezing, rhonchi or rales.   Musculoskeletal:         General: Normal range of motion.      Cervical back: Normal range of motion and neck supple.   Skin:     General: Skin is warm and dry.   Neurological:      General: No focal deficit present.      Mental Status: He is alert and oriented to person, place, and time.      Gait: Gait normal.   Psychiatric:         Mood and Affect: Mood normal.          Component      Latest Ref Rng 10/18/2023   RAPID STREP A SCREEN      Negative  Negative     Acceptable Yes            Assessment:          ICD-10-CM ICD-9-CM   1. Sore throat  J02.9 462        Plan:       Sore throat  -     " POCT rapid strep A          New & refilled meds:  Requested Prescriptions      No prescriptions requested or ordered in this encounter       Follow up if symptoms worsen or fail to improve.     There are no Patient Instructions on file for this visit.         Signature: Kyle Oseguera DNP, FNP-C

## 2023-12-14 ENCOUNTER — OFFICE VISIT (OUTPATIENT)
Dept: PRIMARY CARE CLINIC | Facility: CLINIC | Age: 38
End: 2023-12-14
Payer: COMMERCIAL

## 2023-12-14 VITALS
HEIGHT: 73 IN | OXYGEN SATURATION: 99 % | HEART RATE: 62 BPM | SYSTOLIC BLOOD PRESSURE: 120 MMHG | BODY MASS INDEX: 27.62 KG/M2 | TEMPERATURE: 98 F | WEIGHT: 208.38 LBS | RESPIRATION RATE: 20 BRPM | DIASTOLIC BLOOD PRESSURE: 80 MMHG

## 2023-12-14 DIAGNOSIS — Z00.00 ENCOUNTER FOR ANNUAL HEALTH EXAMINATION: Primary | ICD-10-CM

## 2023-12-14 DIAGNOSIS — Z02.89 ENCOUNTER FOR PHYSICAL EXAMINATION RELATED TO EMPLOYMENT: ICD-10-CM

## 2023-12-14 LAB
CHOLEST SERPL-MCNC: 187 MG/DL (ref 0–200)
CHOLEST/HDLC SERPL: 2.8 {RATIO}
GLUCOSE SERPL-MCNC: 89 MG/DL (ref 74–106)
HDLC SERPL-MCNC: 67 MG/DL (ref 40–60)
LDLC SERPL CALC-MCNC: 102 MG/DL
LDLC/HDLC SERPL: 1.5 {RATIO}
NONHDLC SERPL-MCNC: 120 MG/DL
TRIGL SERPL-MCNC: 88 MG/DL (ref 35–150)
VLDLC SERPL-MCNC: 18 MG/DL

## 2023-12-14 PROCEDURE — 99499 PR PHYSICAL - BASIC NON DOT/CDL: ICD-10-PCS | Mod: ,,, | Performed by: FAMILY MEDICINE

## 2023-12-14 PROCEDURE — 82947 GLUCOSE, FASTING: ICD-10-PCS | Mod: ,,, | Performed by: CLINICAL MEDICAL LABORATORY

## 2023-12-14 PROCEDURE — 80061 LIPID PANEL: ICD-10-PCS | Mod: ,,, | Performed by: CLINICAL MEDICAL LABORATORY

## 2023-12-14 PROCEDURE — 82947 ASSAY GLUCOSE BLOOD QUANT: CPT | Mod: ,,, | Performed by: CLINICAL MEDICAL LABORATORY

## 2023-12-14 PROCEDURE — 99499 UNLISTED E&M SERVICE: CPT | Mod: ,,, | Performed by: FAMILY MEDICINE

## 2023-12-14 PROCEDURE — 80061 LIPID PANEL: CPT | Mod: ,,, | Performed by: CLINICAL MEDICAL LABORATORY

## 2023-12-14 NOTE — PROGRESS NOTES
Subjective     Patient ID: Mane Newton is a 37 y.o. male.    Chief Complaint: Annual Exam ([facility] physical )    Employment physical      Review of Systems   Constitutional:  Negative for fatigue and fever.   HENT:  Negative for dental problem.    Eyes:  Negative for discharge.   Respiratory:  Negative for cough, choking, chest tightness and shortness of breath.    Cardiovascular:  Negative for chest pain and leg swelling.   Gastrointestinal:  Negative for constipation, diarrhea, nausea and vomiting.   Genitourinary:  Negative for discharge and flank pain.   Musculoskeletal:  Negative for arthralgias and myalgias.   Allergic/Immunologic: Negative for environmental allergies.   Neurological:  Negative for headaches and memory loss.   Psychiatric/Behavioral:  Negative for behavioral problems and hallucinations.           Objective     Physical Exam  Vitals and nursing note reviewed.   Constitutional:       Appearance: Normal appearance. He is normal weight.   HENT:      Head: Normocephalic and atraumatic.      Right Ear: Tympanic membrane normal.      Left Ear: Tympanic membrane normal.      Nose: Nose normal.      Mouth/Throat:      Mouth: Mucous membranes are moist.   Eyes:      Extraocular Movements: Extraocular movements intact.      Conjunctiva/sclera: Conjunctivae normal.      Pupils: Pupils are equal, round, and reactive to light.   Cardiovascular:      Rate and Rhythm: Normal rate and regular rhythm.      Pulses: Normal pulses.   Pulmonary:      Effort: Pulmonary effort is normal.      Breath sounds: Normal breath sounds.   Abdominal:      General: Abdomen is flat. Bowel sounds are normal.      Palpations: Abdomen is soft.   Musculoskeletal:         General: Normal range of motion.      Cervical back: Normal range of motion and neck supple.   Skin:     General: Skin is warm and dry.   Neurological:      General: No focal deficit present.      Mental Status: He is alert and oriented to  person, place, and time.   Psychiatric:         Mood and Affect: Mood normal.            Assessment and Plan     1. Encounter for annual health examination  -     Lipid Panel; Future; Expected date: 12/14/2023  -     Glucose, Fasting; Future; Expected date: 12/14/2023    2. Encounter for physical examination related to employment        Form signed  RTC as needed         No follow-ups on file.

## 2024-03-18 PROBLEM — Z00.00 ENCOUNTER FOR ANNUAL HEALTH EXAMINATION: Status: RESOLVED | Noted: 2021-10-01 | Resolved: 2024-03-18

## 2024-08-20 ENCOUNTER — OFFICE VISIT (OUTPATIENT)
Dept: PRIMARY CARE CLINIC | Facility: CLINIC | Age: 39
End: 2024-08-20
Payer: COMMERCIAL

## 2024-08-20 VITALS
DIASTOLIC BLOOD PRESSURE: 81 MMHG | RESPIRATION RATE: 20 BRPM | OXYGEN SATURATION: 100 % | HEART RATE: 59 BPM | HEIGHT: 73 IN | WEIGHT: 217 LBS | TEMPERATURE: 98 F | SYSTOLIC BLOOD PRESSURE: 128 MMHG | BODY MASS INDEX: 28.76 KG/M2

## 2024-08-20 DIAGNOSIS — R53.83 OTHER FATIGUE: Primary | ICD-10-CM

## 2024-08-20 DIAGNOSIS — Z13.1 SCREENING FOR DIABETES MELLITUS: ICD-10-CM

## 2024-08-20 LAB
25(OH)D3 SERPL-MCNC: 20.6 NG/ML
ALBUMIN SERPL BCP-MCNC: 3.9 G/DL (ref 3.5–5)
ALBUMIN/GLOB SERPL: 1.3 {RATIO}
ALP SERPL-CCNC: 47 U/L (ref 45–115)
ALT SERPL W P-5'-P-CCNC: 24 U/L (ref 16–61)
ANION GAP SERPL CALCULATED.3IONS-SCNC: 10 MMOL/L (ref 7–16)
AST SERPL W P-5'-P-CCNC: 20 U/L (ref 15–37)
BASOPHILS # BLD AUTO: 0.05 K/UL (ref 0–0.2)
BASOPHILS NFR BLD AUTO: 1 % (ref 0–1)
BILIRUB SERPL-MCNC: 0.4 MG/DL (ref ?–1.2)
BUN SERPL-MCNC: 13 MG/DL (ref 7–18)
BUN/CREAT SERPL: 9 (ref 6–20)
CALCIUM SERPL-MCNC: 9.1 MG/DL (ref 8.5–10.1)
CHLORIDE SERPL-SCNC: 107 MMOL/L (ref 98–107)
CO2 SERPL-SCNC: 30 MMOL/L (ref 21–32)
CREAT SERPL-MCNC: 1.5 MG/DL (ref 0.7–1.3)
DIFFERENTIAL METHOD BLD: ABNORMAL
EGFR (NO RACE VARIABLE) (RUSH/TITUS): 60 ML/MIN/1.73M2
EOSINOPHIL # BLD AUTO: 0.14 K/UL (ref 0–0.5)
EOSINOPHIL NFR BLD AUTO: 2.9 % (ref 1–4)
ERYTHROCYTE [DISTWIDTH] IN BLOOD BY AUTOMATED COUNT: 13.6 % (ref 11.5–14.5)
EST. AVERAGE GLUCOSE BLD GHB EST-MCNC: 120 MG/DL
GLOBULIN SER-MCNC: 3 G/DL (ref 2–4)
GLUCOSE SERPL-MCNC: 82 MG/DL (ref 74–106)
HBA1C MFR BLD HPLC: 5.8 % (ref 4.5–6.6)
HCT VFR BLD AUTO: 45 % (ref 40–54)
HGB BLD-MCNC: 13.8 G/DL (ref 13.5–18)
HYPOCHROMIA BLD QL SMEAR: ABNORMAL
IMM GRANULOCYTES # BLD AUTO: 0.01 K/UL (ref 0–0.04)
IMM GRANULOCYTES NFR BLD: 0.2 % (ref 0–0.4)
LYMPHOCYTES # BLD AUTO: 2.61 K/UL (ref 1–4.8)
LYMPHOCYTES NFR BLD AUTO: 53.6 % (ref 27–41)
MCH RBC QN AUTO: 22.7 PG (ref 27–31)
MCHC RBC AUTO-ENTMCNC: 30.7 G/DL (ref 32–36)
MCV RBC AUTO: 74 FL (ref 80–96)
MICROCYTES BLD QL SMEAR: ABNORMAL
MONOCYTES # BLD AUTO: 0.5 K/UL (ref 0–0.8)
MONOCYTES NFR BLD AUTO: 10.3 % (ref 2–6)
MPC BLD CALC-MCNC: 11.4 FL (ref 9.4–12.4)
NEUTROPHILS # BLD AUTO: 1.56 K/UL (ref 1.8–7.7)
NEUTROPHILS NFR BLD AUTO: 32 % (ref 53–65)
NRBC # BLD AUTO: 0 X10E3/UL
NRBC, AUTO (.00): 0 %
PLATELET # BLD AUTO: 198 K/UL (ref 150–400)
PLATELET MORPHOLOGY: ABNORMAL
POTASSIUM SERPL-SCNC: 4.3 MMOL/L (ref 3.5–5.1)
PROT SERPL-MCNC: 6.9 G/DL (ref 6.4–8.2)
RBC # BLD AUTO: 6.08 M/UL (ref 4.6–6.2)
SODIUM SERPL-SCNC: 143 MMOL/L (ref 136–145)
TESTOST SERPL-MCNC: 176 NG/DL (ref 240–950)
TSH SERPL DL<=0.005 MIU/L-ACNC: 0.99 UIU/ML (ref 0.36–3.74)
VIT B12 SERPL-MCNC: 414 PG/ML (ref 193–986)
WBC # BLD AUTO: 4.87 K/UL (ref 4.5–11)

## 2024-08-20 PROCEDURE — 3008F BODY MASS INDEX DOCD: CPT | Mod: CPTII,,, | Performed by: NURSE PRACTITIONER

## 2024-08-20 PROCEDURE — 82607 VITAMIN B-12: CPT | Mod: ,,, | Performed by: CLINICAL MEDICAL LABORATORY

## 2024-08-20 PROCEDURE — 80050 GENERAL HEALTH PANEL: CPT | Mod: ,,, | Performed by: CLINICAL MEDICAL LABORATORY

## 2024-08-20 PROCEDURE — 1159F MED LIST DOCD IN RCRD: CPT | Mod: CPTII,,, | Performed by: NURSE PRACTITIONER

## 2024-08-20 PROCEDURE — 84403 ASSAY OF TOTAL TESTOSTERONE: CPT | Mod: ,,, | Performed by: CLINICAL MEDICAL LABORATORY

## 2024-08-20 PROCEDURE — 3074F SYST BP LT 130 MM HG: CPT | Mod: CPTII,,, | Performed by: NURSE PRACTITIONER

## 2024-08-20 PROCEDURE — 3079F DIAST BP 80-89 MM HG: CPT | Mod: CPTII,,, | Performed by: NURSE PRACTITIONER

## 2024-08-20 PROCEDURE — 1160F RVW MEDS BY RX/DR IN RCRD: CPT | Mod: CPTII,,, | Performed by: NURSE PRACTITIONER

## 2024-08-20 PROCEDURE — 82306 VITAMIN D 25 HYDROXY: CPT | Mod: ,,, | Performed by: CLINICAL MEDICAL LABORATORY

## 2024-08-20 PROCEDURE — 99213 OFFICE O/P EST LOW 20 MIN: CPT | Mod: ,,, | Performed by: NURSE PRACTITIONER

## 2024-08-20 PROCEDURE — 83036 HEMOGLOBIN GLYCOSYLATED A1C: CPT | Mod: ,,, | Performed by: CLINICAL MEDICAL LABORATORY

## 2024-08-20 NOTE — PROGRESS NOTES
[facility]  [facility] [place] [address]  Ph: [phone] Kyle Oseguera DNP, FNP-C  [facility]  Primary Care       PATIENT NAME: Mane Newton   : 1985    AGE: 38 y.o. DATE: 2024    MRN: [id number]        Reason for Visit / Chief Complaint:  Fatigue (Pt staystired  concerned his testosterone level may be low  )     Subjective:     HPI: Patient states he has been having some fatigue. Has history of testosterone level being low. Patient would like to have labs checked.     Fatigue  Associated symptoms include fatigue. Pertinent negatives include no chest pain, coughing, fever, headaches or rash.          Review of Systems: Review of Systems   Constitutional:  Positive for fatigue. Negative for fever.   Respiratory:  Negative for cough and shortness of breath.    Cardiovascular:  Negative for chest pain.   Genitourinary:  Negative for dysuria.   Musculoskeletal:  Negative for gait problem.   Skin:  Negative for rash.   Neurological:  Negative for headaches.          Review of patient's allergies indicates:  No Known Allergies     Med List:  Current Outpatient Medications on File Prior to Visit   Medication Sig Dispense Refill    azithromycin (Z-OZIEL) 250 MG tablet Take 2 tablets by mouth on day 1; Take 1 tablet by mouth on days 2-5 (Patient not taking: Reported on 10/18/2023) 6 tablet 0    dexchlorphen-phenylephrine-DM (POLYTUSSIN DM) 1-5-10 mg/5 mL Syrp Take 10 mLs by mouth every 6 (six) hours while awake. (Patient not taking: Reported on 10/18/2023) 240 mL 1    ibuprofen (ADVIL,MOTRIN) 800 MG tablet Take 1 tablet (800 mg total) by mouth every 8 (eight) hours as needed for Pain. (Patient not taking: Reported on 10/18/2023) 30 tablet 0     No current facility-administered medications on file prior to visit.       Medical/Social/Family History:  History reviewed. No pertinent past medical history.   Social History     Tobacco Use   Smoking Status Never  "  Smokeless Tobacco Never      Social History     Substance and Sexual Activity   Alcohol Use Yes       Family History   Problem Relation Name Age of Onset    Cancer Father        History reviewed. No pertinent surgical history.     There is no immunization history on file for this patient.       Objective:      Vitals:    08/20/24 1352   BP: 128/81   BP Location: Right arm   Patient Position: Sitting   BP Method: Large (Automatic)   Pulse: (!) 59   Resp: 20   Temp: 98.1 °F (36.7 °C)   TempSrc: Oral   SpO2: 100%   Weight: 98.4 kg (217 lb)   Height: 6' 1" (1.854 m)     Body mass index is 28.63 kg/m².     Physical Exam: Physical Exam  Constitutional:       General: He is not in acute distress.     Appearance: Normal appearance. He is not ill-appearing, toxic-appearing or diaphoretic.   HENT:      Head: Normocephalic.      Mouth/Throat:      Mouth: Mucous membranes are moist.   Eyes:      Extraocular Movements: Extraocular movements intact.      Conjunctiva/sclera: Conjunctivae normal.      Pupils: Pupils are equal, round, and reactive to light.   Cardiovascular:      Rate and Rhythm: Regular rhythm. Bradycardia present.      Heart sounds: Normal heart sounds.   Pulmonary:      Effort: Pulmonary effort is normal. No respiratory distress.      Breath sounds: Normal breath sounds. No stridor. No wheezing, rhonchi or rales.   Musculoskeletal:         General: Normal range of motion.      Cervical back: Normal range of motion and neck supple.   Skin:     General: Skin is warm and dry.   Neurological:      General: No focal deficit present.      Mental Status: He is alert and oriented to person, place, and time.      Gait: Gait normal.   Psychiatric:         Mood and Affect: Mood normal.         Behavior: Behavior normal.                Assessment:          ICD-10-CM ICD-9-CM   1. Other fatigue  R53.83 780.79   2. Screening for diabetes mellitus  Z13.1 V77.1        Plan:       Other fatigue  -     Testosterone; Future; " Expected date: 08/20/2024  -     Vitamin D; Future; Expected date: 08/20/2024  -     Vitamin B12; Future; Expected date: 08/20/2024  -     CBC Auto Differential; Future; Expected date: 08/20/2024  -     TSH; Future; Expected date: 08/20/2024  -     Comprehensive Metabolic Panel; Future; Expected date: 08/20/2024    Screening for diabetes mellitus  -     Hemoglobin A1C; Future; Expected date: 08/20/2024          New & refilled meds:  Requested Prescriptions      No prescriptions requested or ordered in this encounter       Follow up if symptoms worsen or fail to improve.     There are no Patient Instructions on file for this visit.         Signature: Kyle Oseguera DNP, FNP-C

## 2024-08-21 ENCOUNTER — TELEPHONE (OUTPATIENT)
Dept: PRIMARY CARE CLINIC | Facility: CLINIC | Age: 39
End: 2024-08-21
Payer: COMMERCIAL

## 2024-08-21 DIAGNOSIS — E55.9 VITAMIN D DEFICIENCY: Primary | ICD-10-CM

## 2024-08-21 RX ORDER — ERGOCALCIFEROL 1.25 MG/1
CAPSULE ORAL
Qty: 4 CAPSULE | Refills: 2 | Status: SHIPPED | OUTPATIENT
Start: 2024-08-21

## 2024-08-21 NOTE — TELEPHONE ENCOUNTER
----- Message from Adlyfe sent at 8/21/2024  4:07 PM CDT -----  Pt called saying he was half asleep and did not understand his lab results. He asked if you could call him back and explain it to him. [phone]

## 2024-08-21 NOTE — TELEPHONE ENCOUNTER
----- Message from Kyle Oseguera DNP, FNP-C sent at 8/21/2024  3:06 PM CDT -----  Please notify of results.     Creatinine is 1.5. He needs to avoid NSAIDS.     Testosterone  level is low. Spoke with Dr. Lord. Patient came come in every 3 weeks for testosterone injection (200 mg dose), and recheck level in 3 months.     Hgb A1C is 5.8, which indicates prediabetes. He needs to really watch his diet.     Vitamin D level is not sufficient. Vitamin d sent to the pharmacy (50,000 units once a week x 12 weeks).

## 2024-09-05 ENCOUNTER — TELEPHONE (OUTPATIENT)
Dept: PRIMARY CARE CLINIC | Facility: CLINIC | Age: 39
End: 2024-09-05
Payer: COMMERCIAL

## 2024-09-05 NOTE — TELEPHONE ENCOUNTER
----- Message from Chichi Rodriguez sent at 9/5/2024 10:00 AM CDT -----  Regarding: call  Call pt back at #[phone]. Did not leave a message.

## 2024-09-06 ENCOUNTER — CLINICAL SUPPORT (OUTPATIENT)
Dept: PRIMARY CARE CLINIC | Facility: CLINIC | Age: 39
End: 2024-09-06
Payer: COMMERCIAL

## 2024-09-06 DIAGNOSIS — E34.9 TESTOSTERONE DEFICIENCY: ICD-10-CM

## 2024-09-06 DIAGNOSIS — R79.89 LOW TESTOSTERONE LEVEL IN MALE: Primary | ICD-10-CM

## 2024-09-06 RX ORDER — TESTOSTERONE CYPIONATE 200 MG/ML
200 INJECTION, SOLUTION INTRAMUSCULAR
Status: COMPLETED | OUTPATIENT
Start: 2024-09-06 | End: 2024-09-06

## 2024-09-06 RX ADMIN — TESTOSTERONE CYPIONATE 200 MG: 200 INJECTION, SOLUTION INTRAMUSCULAR at 09:09

## 2024-09-26 ENCOUNTER — CLINICAL SUPPORT (OUTPATIENT)
Dept: PRIMARY CARE CLINIC | Facility: CLINIC | Age: 39
End: 2024-09-26
Payer: COMMERCIAL

## 2024-09-26 DIAGNOSIS — R79.89 LOW TESTOSTERONE LEVEL IN MALE: Primary | ICD-10-CM

## 2024-09-26 RX ORDER — TESTOSTERONE CYPIONATE 200 MG/ML
200 INJECTION, SOLUTION INTRAMUSCULAR
Status: COMPLETED | OUTPATIENT
Start: 2024-09-26 | End: 2024-09-26

## 2024-09-26 RX ADMIN — TESTOSTERONE CYPIONATE 200 MG: 200 INJECTION, SOLUTION INTRAMUSCULAR at 10:09

## 2024-10-17 ENCOUNTER — CLINICAL SUPPORT (OUTPATIENT)
Dept: PRIMARY CARE CLINIC | Facility: CLINIC | Age: 39
End: 2024-10-17
Payer: COMMERCIAL

## 2024-10-17 DIAGNOSIS — E34.9 TESTOSTERONE DEFICIENCY: Primary | ICD-10-CM

## 2024-10-17 PROCEDURE — 96372 THER/PROPH/DIAG INJ SC/IM: CPT | Mod: ,,, | Performed by: NURSE PRACTITIONER

## 2024-10-17 RX ORDER — TESTOSTERONE CYPIONATE 200 MG/ML
200 INJECTION, SOLUTION INTRAMUSCULAR
Status: COMPLETED | OUTPATIENT
Start: 2024-10-17 | End: 2024-10-17

## 2024-10-17 RX ADMIN — TESTOSTERONE CYPIONATE 200 MG: 200 INJECTION, SOLUTION INTRAMUSCULAR at 09:10

## 2025-01-07 ENCOUNTER — OFFICE VISIT (OUTPATIENT)
Dept: PRIMARY CARE CLINIC | Facility: CLINIC | Age: 40
End: 2025-01-07
Payer: COMMERCIAL

## 2025-01-07 VITALS
BODY MASS INDEX: 27.17 KG/M2 | HEIGHT: 73 IN | RESPIRATION RATE: 20 BRPM | OXYGEN SATURATION: 100 % | SYSTOLIC BLOOD PRESSURE: 127 MMHG | WEIGHT: 205 LBS | HEART RATE: 61 BPM | DIASTOLIC BLOOD PRESSURE: 84 MMHG | TEMPERATURE: 97 F

## 2025-01-07 DIAGNOSIS — M54.50 CHRONIC LEFT-SIDED LOW BACK PAIN WITHOUT SCIATICA: Primary | ICD-10-CM

## 2025-01-07 DIAGNOSIS — R79.89 LOW TESTOSTERONE IN MALE: ICD-10-CM

## 2025-01-07 DIAGNOSIS — G89.29 CHRONIC LEFT-SIDED LOW BACK PAIN WITHOUT SCIATICA: Primary | ICD-10-CM

## 2025-01-07 LAB
BILIRUB SERPL-MCNC: NORMAL MG/DL
BLOOD URINE, POC: NEGATIVE
CLARITY, UA: CLEAR
COLOR, UA: NORMAL
GLUCOSE UR QL STRIP: NEGATIVE
KETONES UR QL STRIP: NEGATIVE
LEUKOCYTE ESTERASE URINE, POC: NEGATIVE
NITRITE, POC UA: NEGATIVE
PH, POC UA: 6
PROTEIN, POC: NEGATIVE
SPECIFIC GRAVITY, POC UA: >=1.03
UROBILINOGEN, POC UA: 0.2

## 2025-01-07 PROCEDURE — 99213 OFFICE O/P EST LOW 20 MIN: CPT | Mod: ,,, | Performed by: NURSE PRACTITIONER

## 2025-01-07 PROCEDURE — 3079F DIAST BP 80-89 MM HG: CPT | Mod: CPTII,,, | Performed by: NURSE PRACTITIONER

## 2025-01-07 PROCEDURE — 84403 ASSAY OF TOTAL TESTOSTERONE: CPT | Mod: ,,, | Performed by: CLINICAL MEDICAL LABORATORY

## 2025-01-07 PROCEDURE — 1159F MED LIST DOCD IN RCRD: CPT | Mod: CPTII,,, | Performed by: NURSE PRACTITIONER

## 2025-01-07 PROCEDURE — 3008F BODY MASS INDEX DOCD: CPT | Mod: CPTII,,, | Performed by: NURSE PRACTITIONER

## 2025-01-07 PROCEDURE — 3074F SYST BP LT 130 MM HG: CPT | Mod: CPTII,,, | Performed by: NURSE PRACTITIONER

## 2025-01-07 NOTE — PROGRESS NOTES
[facility]  [facility] [place] [address]  Ph: [phone] Kyle Oseguera DNP, FNP-C  [facility]  Primary Care       PATIENT NAME: Mane Newton   : 1985    AGE: 39 y.o. DATE: 2025    MRN: [id number]        Reason for Visit / Chief Complaint:  Urinary Tract Infection (Pt has been having low back pain.)     Subjective:     HPI: Patient here to have testosterone level rechecked. States he has been some low back pain for more than 3 months. Denies any injury to lower back.     Urinary Tract Infection   Pertinent negatives include no rash.          Review of Systems: Review of Systems   Constitutional:  Negative for fever.   Respiratory:  Negative for cough and shortness of breath.    Cardiovascular:  Negative for chest pain.   Genitourinary:  Negative for dysuria.   Musculoskeletal:  Positive for back pain. Negative for gait problem.   Skin:  Negative for rash.   Neurological:  Negative for headaches.          Review of patient's allergies indicates:  No Known Allergies     Med List:  Current Outpatient Medications on File Prior to Visit   Medication Sig Dispense Refill    azithromycin (Z-OZIEL) 250 MG tablet Take 2 tablets by mouth on day 1; Take 1 tablet by mouth on days 2-5 (Patient not taking: Reported on 10/18/2023) 6 tablet 0    dexchlorphen-phenylephrine-DM (POLYTUSSIN DM) 1-5-10 mg/5 mL Syrp Take 10 mLs by mouth every 6 (six) hours while awake. (Patient not taking: Reported on 10/18/2023) 240 mL 1    ergocalciferol (ERGOCALCIFEROL) 50,000 unit Cap Take one capsule by mouth once a week x 12 weeks 4 capsule 2    ibuprofen (ADVIL,MOTRIN) 800 MG tablet Take 1 tablet (800 mg total) by mouth every 8 (eight) hours as needed for Pain. (Patient not taking: Reported on 10/18/2023) 30 tablet 0     No current facility-administered medications on file prior to visit.       Medical/Social/Family History:  History reviewed. No pertinent past medical history.  "  Social History     Tobacco Use   Smoking Status Never   Smokeless Tobacco Never      Social History     Substance and Sexual Activity   Alcohol Use Yes       Family History   Problem Relation Name Age of Onset    Cancer Father        History reviewed. No pertinent surgical history.     There is no immunization history on file for this patient.       Objective:      Vitals:    01/07/25 1657   BP: 127/84   BP Location: Right arm   Patient Position: Sitting   Pulse: 61   Resp: 20   Temp: 97.4 °F (36.3 °C)   TempSrc: Oral   SpO2: 100%   Weight: 93 kg (205 lb)   Height: 6' 1" (1.854 m)     Body mass index is 27.05 kg/m².     Physical Exam: Physical Exam  Constitutional:       General: He is not in acute distress.     Appearance: Normal appearance. He is not ill-appearing, toxic-appearing or diaphoretic.   HENT:      Head: Normocephalic.      Nose: Nose normal.      Mouth/Throat:      Mouth: Mucous membranes are moist.   Eyes:      Extraocular Movements: Extraocular movements intact.      Conjunctiva/sclera: Conjunctivae normal.      Pupils: Pupils are equal, round, and reactive to light.   Cardiovascular:      Rate and Rhythm: Normal rate and regular rhythm.      Heart sounds: Normal heart sounds.   Pulmonary:      Effort: Pulmonary effort is normal. No respiratory distress.      Breath sounds: Normal breath sounds. No stridor. No wheezing, rhonchi or rales.   Abdominal:      Palpations: Abdomen is soft.   Musculoskeletal:         General: No tenderness. Normal range of motion.      Cervical back: Normal range of motion and neck supple.   Skin:     General: Skin is warm and dry.   Neurological:      General: No focal deficit present.      Mental Status: He is alert and oriented to person, place, and time.      Gait: Gait normal.   Psychiatric:         Mood and Affect: Mood normal.         Behavior: Behavior normal.                Assessment:          ICD-10-CM ICD-9-CM   1. Chronic left-sided low back pain without " sciatica  M54.50 724.2    G89.29 338.29   2. Low testosterone in male  R79.89 790.99        Plan:       Chronic left-sided low back pain without sciatica  -     X-Ray Lumbar Spine AP And Lateral; Future; Expected date: 01/07/2025  -     POCT URINALYSIS W/O SCOPE         - Recommend and offered referral to physical therapy, but patient refused at this time; patient states he will make an appointment with Chiropractor.         - Recommend Ibuprofen or Tylenol OTC prn for pain    Low testosterone in male  -     Testosterone; Future; Expected date: 01/07/2025 1/7/2025   Color, UA POC DARK YELLOW    Clarity, UA, POC CLEAR    Spec Grav UA >=1.030    pH, UA 6.0    WBC, UA NEGATIVE    Nitrite, UA NEGATIVE    Protein, POC NEGATIVE    Glucose, UA NEGATIVE    Ketones, UA NEGATIVE    Bilirubin, POC SMALL    Urobilinogen, UA 0.2    Blood, UA NEGATIVE      X-ray of lumbar spine results discussed with patient:      FINDINGS:  No acute fracture or subluxation.     There is a minimal dextroscoliosis.  The lumbar vertebral bodies are normal in height and alignment.  There is mild to moderate multilevel degenerative disc disease most pronounced at L5-S1.     SI joints are intact. Large amount of stool throughout the visualized loops of colon.     Impression:     Mild to moderate multilevel degenerative disc disease.        New & refilled meds:  Requested Prescriptions      No prescriptions requested or ordered in this encounter       Follow up if symptoms worsen or fail to improve.     There are no Patient Instructions on file for this visit.         Signature: Kyle Oseguera DNP, FNP-C

## 2025-01-08 ENCOUNTER — OFFICE VISIT (OUTPATIENT)
Dept: PRIMARY CARE CLINIC | Facility: CLINIC | Age: 40
End: 2025-01-08
Payer: COMMERCIAL

## 2025-01-08 VITALS
OXYGEN SATURATION: 97 % | TEMPERATURE: 97 F | DIASTOLIC BLOOD PRESSURE: 75 MMHG | SYSTOLIC BLOOD PRESSURE: 111 MMHG | WEIGHT: 207 LBS | HEART RATE: 66 BPM | BODY MASS INDEX: 28.98 KG/M2 | HEIGHT: 71 IN | RESPIRATION RATE: 20 BRPM

## 2025-01-08 DIAGNOSIS — Z00.00 ENCOUNTER FOR GENERAL ADULT MEDICAL EXAMINATION WITHOUT ABNORMAL FINDINGS: Primary | ICD-10-CM

## 2025-01-08 LAB
CHOLEST SERPL-MCNC: 186 MG/DL
CHOLEST/HDLC SERPL: 3.3 {RATIO}
GLUCOSE SERPL-MCNC: 95 MG/DL (ref 70–100)
HDLC SERPL-MCNC: 56 MG/DL (ref 35–60)
LDLC SERPL CALC-MCNC: 118 MG/DL
LDLC/HDLC SERPL: 2.1 {RATIO}
NONHDLC SERPL-MCNC: 130 MG/DL
TESTOST SERPL-MCNC: 432.5 NG/DL (ref 240.2–870.7)
TRIGL SERPL-MCNC: 62 MG/DL (ref 34–140)
VLDLC SERPL-MCNC: 12 MG/DL

## 2025-01-08 PROCEDURE — 3008F BODY MASS INDEX DOCD: CPT | Mod: CPTII,,, | Performed by: NURSE PRACTITIONER

## 2025-01-08 PROCEDURE — 3074F SYST BP LT 130 MM HG: CPT | Mod: CPTII,,, | Performed by: NURSE PRACTITIONER

## 2025-01-08 PROCEDURE — 82947 ASSAY GLUCOSE BLOOD QUANT: CPT | Mod: ,,, | Performed by: CLINICAL MEDICAL LABORATORY

## 2025-01-08 PROCEDURE — 99396 PREV VISIT EST AGE 40-64: CPT | Mod: ,,, | Performed by: NURSE PRACTITIONER

## 2025-01-08 PROCEDURE — 1160F RVW MEDS BY RX/DR IN RCRD: CPT | Mod: CPTII,,, | Performed by: NURSE PRACTITIONER

## 2025-01-08 PROCEDURE — 3078F DIAST BP <80 MM HG: CPT | Mod: CPTII,,, | Performed by: NURSE PRACTITIONER

## 2025-01-08 PROCEDURE — 80061 LIPID PANEL: CPT | Mod: ,,, | Performed by: CLINICAL MEDICAL LABORATORY

## 2025-01-08 PROCEDURE — 1159F MED LIST DOCD IN RCRD: CPT | Mod: CPTII,,, | Performed by: NURSE PRACTITIONER

## 2025-01-08 NOTE — PROGRESS NOTES
[facility]  [address] [place] [id number]  Ph: [phone] Kyle Oseguera DNP, FNP-C  [facility]  Primary Care       PATIENT NAME: Mane Newton   : 1985    AGE: 39 y.o. DATE: 2025    MRN: [id number]        Reason for Visit / Chief Complaint:  Annual Exam ([facility] wellness screen)     Subjective:     HPI: Patient here for visit for health screening           Review of Systems: Review of Systems   Constitutional: Negative.  Negative for fever.   HENT: Negative.     Eyes: Negative.    Respiratory: Negative.  Negative for cough and shortness of breath.    Cardiovascular: Negative.  Negative for chest pain.   Gastrointestinal: Negative.    Endocrine: Negative.    Genitourinary: Negative.  Negative for dysuria.   Musculoskeletal: Negative.  Negative for gait problem.   Skin:  Negative for rash.   Allergic/Immunologic: Negative.    Neurological: Negative.  Negative for headaches.   Hematological: Negative.    Psychiatric/Behavioral: Negative.            Review of patient's allergies indicates:  No Known Allergies     Med List:  Current Outpatient Medications on File Prior to Visit   Medication Sig Dispense Refill    azithromycin (Z-OZIEL) 250 MG tablet Take 2 tablets by mouth on day 1; Take 1 tablet by mouth on days 2-5 (Patient not taking: Reported on 10/18/2023) 6 tablet 0    dexchlorphen-phenylephrine-DM (POLYTUSSIN DM) 1-5-10 mg/5 mL Syrp Take 10 mLs by mouth every 6 (six) hours while awake. (Patient not taking: Reported on 10/18/2023) 240 mL 1    ergocalciferol (ERGOCALCIFEROL) 50,000 unit Cap Take one capsule by mouth once a week x 12 weeks 4 capsule 2    ibuprofen (ADVIL,MOTRIN) 800 MG tablet Take 1 tablet (800 mg total) by mouth every 8 (eight) hours as needed for Pain. (Patient not taking: Reported on 10/18/2023) 30 tablet 0     No current facility-administered medications on file prior to visit.       Medical/Social/Family  "History:  History reviewed. No pertinent past medical history.   Social History     Tobacco Use   Smoking Status Never   Smokeless Tobacco Never      Social History     Substance and Sexual Activity   Alcohol Use Yes       Family History   Problem Relation Name Age of Onset    Cancer Father        History reviewed. No pertinent surgical history.     There is no immunization history on file for this patient.       Objective:      Vitals:    01/08/25 1148   BP: 111/75   BP Location: Right arm   Patient Position: Sitting   Pulse: 66   Resp: 20   Temp: 97 °F (36.1 °C)   TempSrc: Oral   SpO2: 97%   Weight: 93.9 kg (207 lb)   Height: 5' 11" (1.803 m)     Body mass index is 28.87 kg/m².     Physical Exam: Physical Exam  Constitutional:       General: He is not in acute distress.     Appearance: Normal appearance. He is not ill-appearing, toxic-appearing or diaphoretic.   HENT:      Head: Normocephalic.      Right Ear: Tympanic membrane, ear canal and external ear normal.      Left Ear: Tympanic membrane, ear canal and external ear normal.      Nose: Nose normal.      Mouth/Throat:      Mouth: Mucous membranes are moist.   Eyes:      Extraocular Movements: Extraocular movements intact.      Conjunctiva/sclera: Conjunctivae normal.      Pupils: Pupils are equal, round, and reactive to light.   Cardiovascular:      Rate and Rhythm: Normal rate and regular rhythm.      Heart sounds: Normal heart sounds.   Pulmonary:      Effort: Pulmonary effort is normal. No respiratory distress.      Breath sounds: Normal breath sounds. No stridor. No wheezing, rhonchi or rales.   Abdominal:      General: Bowel sounds are normal. There is no distension.      Palpations: Abdomen is soft.      Tenderness: There is no abdominal tenderness.   Musculoskeletal:         General: Normal range of motion.      Cervical back: Normal range of motion and neck supple.   Skin:     General: Skin is warm and dry.   Neurological:      General: No focal " deficit present.      Mental Status: He is alert and oriented to person, place, and time.      Gait: Gait normal.   Psychiatric:         Mood and Affect: Mood normal.         Behavior: Behavior normal.                Assessment:          ICD-10-CM ICD-9-CM   1. Encounter for general adult medical examination without abnormal findings  Z00.00 V70.9        Plan:       Encounter for general adult medical examination without abnormal findings  -     Lipid Panel; Future; Expected date: 01/08/2025  -     Glucose, Fasting; Future; Expected date: 01/08/2025          New & refilled meds:  Requested Prescriptions      No prescriptions requested or ordered in this encounter       Follow up if symptoms worsen or fail to improve.     There are no Patient Instructions on file for this visit.         Signature: Kyle Oseguera DNP, FNP-C

## 2025-01-10 ENCOUNTER — TELEPHONE (OUTPATIENT)
Dept: PRIMARY CARE CLINIC | Facility: CLINIC | Age: 40
End: 2025-01-10
Payer: COMMERCIAL

## 2025-01-10 NOTE — TELEPHONE ENCOUNTER
----- Message from Kyle Oseguera DNP, ALIRIOP-C sent at 1/9/2025  2:51 PM CST -----  Please notify of results.     LDL is 118, goal is below 100. He needs to limit fried foods, highly processed foods.     Needs labs added to insurance form.

## 2025-01-10 NOTE — TELEPHONE ENCOUNTER
----- Message from Kyle Oseguera DNP, FNP-C sent at 1/9/2025  2:47 PM CST -----  Please notify of results.       Testosterone level has improved.

## 2025-01-17 ENCOUNTER — CLINICAL SUPPORT (OUTPATIENT)
Dept: PRIMARY CARE CLINIC | Facility: CLINIC | Age: 40
End: 2025-01-17
Payer: COMMERCIAL

## 2025-01-17 DIAGNOSIS — E34.9 TESTOSTERONE DEFICIENCY: ICD-10-CM

## 2025-01-17 DIAGNOSIS — R79.89 LOW TESTOSTERONE IN MALE: Primary | ICD-10-CM

## 2025-01-17 PROCEDURE — 96372 THER/PROPH/DIAG INJ SC/IM: CPT | Mod: ,,, | Performed by: NURSE PRACTITIONER

## 2025-01-17 RX ORDER — TESTOSTERONE CYPIONATE 200 MG/ML
200 INJECTION, SOLUTION INTRAMUSCULAR
Status: COMPLETED | OUTPATIENT
Start: 2025-01-17 | End: 2025-01-17

## 2025-01-17 RX ADMIN — TESTOSTERONE CYPIONATE 200 MG: 200 INJECTION, SOLUTION INTRAMUSCULAR at 09:01

## 2025-02-13 ENCOUNTER — CLINICAL SUPPORT (OUTPATIENT)
Dept: PRIMARY CARE CLINIC | Facility: CLINIC | Age: 40
End: 2025-02-13
Payer: COMMERCIAL

## 2025-02-13 DIAGNOSIS — R79.89 LOW TESTOSTERONE IN MALE: Primary | ICD-10-CM

## 2025-02-13 DIAGNOSIS — E34.9 TESTOSTERONE DEFICIENCY: ICD-10-CM

## 2025-02-13 PROCEDURE — 96372 THER/PROPH/DIAG INJ SC/IM: CPT | Mod: ,,, | Performed by: NURSE PRACTITIONER

## 2025-02-13 RX ORDER — TESTOSTERONE CYPIONATE 200 MG/ML
200 INJECTION, SOLUTION INTRAMUSCULAR
Status: COMPLETED | OUTPATIENT
Start: 2025-02-13 | End: 2025-02-13

## 2025-02-13 RX ADMIN — TESTOSTERONE CYPIONATE 200 MG: 200 INJECTION, SOLUTION INTRAMUSCULAR at 08:02
